# Patient Record
Sex: FEMALE | Race: WHITE | Employment: STUDENT | URBAN - METROPOLITAN AREA
[De-identification: names, ages, dates, MRNs, and addresses within clinical notes are randomized per-mention and may not be internally consistent; named-entity substitution may affect disease eponyms.]

---
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
## 2019-02-28 VITALS
WEIGHT: 160 LBS | SYSTOLIC BLOOD PRESSURE: 110 MMHG | HEART RATE: 105 BPM | OXYGEN SATURATION: 95 % | DIASTOLIC BLOOD PRESSURE: 60 MMHG | HEIGHT: 63 IN | BODY MASS INDEX: 28.35 KG/M2

## 2019-04-03 RX ORDER — DROSPIRENONE AND ETHINYL ESTRADIOL 0.03MG-3MG
KIT ORAL
Qty: 84 TABLET | Refills: 1 | OUTPATIENT
Start: 2019-04-03

## 2019-04-05 RX ORDER — DROSPIRENONE AND ETHINYL ESTRADIOL 0.03MG-3MG
1 KIT ORAL DAILY
Qty: 1 PACKAGE | Refills: 1 | Status: SHIPPED | OUTPATIENT
Start: 2019-04-05 | End: 2019-04-09

## 2019-04-06 RX ORDER — DROSPIRENONE AND ETHINYL ESTRADIOL 0.03MG-3MG
KIT ORAL
Qty: 84 TABLET | Refills: 1 | OUTPATIENT
Start: 2019-04-06

## 2019-04-06 NOTE — TELEPHONE ENCOUNTER
Calling to verify that birth control rx was sent to correct pharmacy.  Pt's chart has been updated [facility] [place]. pls adv

## 2019-04-08 RX ORDER — DROSPIRENONE AND ETHINYL ESTRADIOL 0.03MG-3MG
KIT ORAL
Qty: 84 TABLET | Refills: 1 | OUTPATIENT
Start: 2019-04-08

## 2019-04-08 NOTE — TELEPHONE ENCOUNTER
Pt's last annual was 5/14/18. Pt has annual scheduled on 5/16/19. One pack with one refill was sent to the pharmacy on 4/5/19. Rx denied. Pt already has refills.

## 2019-04-09 RX ORDER — DROSPIRENONE AND ETHINYL ESTRADIOL 0.03MG-3MG
KIT ORAL
Qty: 84 TABLET | Refills: 1 | OUTPATIENT
Start: 2019-04-09

## 2019-04-09 RX ORDER — DROSPIRENONE AND ETHINYL ESTRADIOL 0.03MG-3MG
1 KIT ORAL DAILY
Qty: 1 PACKAGE | Refills: 1 | Status: SHIPPED | OUTPATIENT
Start: 2019-04-09 | End: 2019-05-28

## 2019-05-28 ENCOUNTER — OFFICE VISIT (OUTPATIENT)
Dept: OBGYN CLINIC | Facility: CLINIC | Age: 19
End: 2019-05-28

## 2019-05-28 VITALS
WEIGHT: 156 LBS | HEART RATE: 118 BPM | BODY MASS INDEX: 29 KG/M2 | DIASTOLIC BLOOD PRESSURE: 88 MMHG | SYSTOLIC BLOOD PRESSURE: 132 MMHG

## 2019-05-28 DIAGNOSIS — Z01.419 ENCOUNTER FOR GYNECOLOGICAL EXAMINATION WITHOUT ABNORMAL FINDING: Primary | ICD-10-CM

## 2019-05-28 DIAGNOSIS — Z30.41 ORAL CONTRACEPTIVE PILL SURVEILLANCE: ICD-10-CM

## 2019-05-28 PROCEDURE — 99395 PREV VISIT EST AGE 18-39: CPT | Performed by: OBSTETRICS & GYNECOLOGY

## 2019-05-28 RX ORDER — DROSPIRENONE AND ETHINYL ESTRADIOL 0.03MG-3MG
1 KIT ORAL DAILY
Qty: 3 PACKAGE | Refills: 3 | Status: SHIPPED | OUTPATIENT
Start: 2019-05-28 | End: 2020-07-10

## 2019-05-28 NOTE — PROGRESS NOTES
Miguel Rodriguez is a 20year old female  Patient's last menstrual period was 2019 (approximate). Patient presents with:  Gyn Exam: Annual  Medication Request: ocps  .     OBSTETRICS HISTORY:  OB History    Para Term  AB Livin partner: Not on file        Emotionally abused: Not on file        Physically abused: Not on file        Forced sexual activity: Not on file    Other Topics      Concerns:        Not on file    Social History Narrative      Not on file      FAMILY HISTORY: retraction or skin changes  Abdomen:   soft, nontender, nondistended, no masses  Skin/Hair:  no unusual rashes or bruises  Extremities:  no edema, no cyanosis  Psychiatric:   oriented to time, place, person and situation.  Appropriate mood and affect    Pel

## 2019-07-22 ENCOUNTER — OFFICE VISIT (OUTPATIENT)
Dept: INTERNAL MEDICINE CLINIC | Facility: CLINIC | Age: 19
End: 2019-07-22

## 2019-07-22 VITALS
SYSTOLIC BLOOD PRESSURE: 135 MMHG | WEIGHT: 160 LBS | TEMPERATURE: 98 F | BODY MASS INDEX: 29.44 KG/M2 | HEIGHT: 62 IN | HEART RATE: 111 BPM | DIASTOLIC BLOOD PRESSURE: 83 MMHG

## 2019-07-22 DIAGNOSIS — R47.9 SPEECH DISTURBANCE, UNSPECIFIED TYPE: ICD-10-CM

## 2019-07-22 DIAGNOSIS — R22.42 SKIN LUMP OF LEG, LEFT: Primary | ICD-10-CM

## 2019-07-22 PROCEDURE — 99214 OFFICE O/P EST MOD 30 MIN: CPT | Performed by: INTERNAL MEDICINE

## 2019-07-22 NOTE — PROGRESS NOTES
Tae Pitts is a 20year old female. Patient presents with:  Bump: left leg      HPI:     HPI  Patient is a 50-year-old female here with the complaints of a diffuse lump on the left shin. Initially noticed it 3 years ago.   She thinks it is sligh Location: Right arm, Patient Position: Sitting, Cuff Size: adult)   Pulse 111   Temp 98.2 °F (36.8 °C) (Oral)   Ht 5' 2\" (1.575 m)   Wt 160 lb (72.6 kg)   BMI 29.26 kg/m²   Physical Exam   Constitutional: She is oriented to person, place, and time.  She ap complete thorough neurological exam which was completely normal.  Counseling was given. Advised to avoid anxiety. She may follow-up if the speech issue continues to be worse or worsening anxiety and any other associated symptoms.   She is comfortable with

## 2019-08-27 ENCOUNTER — OFFICE VISIT (OUTPATIENT)
Dept: INTERNAL MEDICINE CLINIC | Facility: CLINIC | Age: 19
End: 2019-08-27

## 2019-08-27 VITALS
BODY MASS INDEX: 29.63 KG/M2 | HEART RATE: 106 BPM | WEIGHT: 161 LBS | HEIGHT: 62 IN | DIASTOLIC BLOOD PRESSURE: 84 MMHG | SYSTOLIC BLOOD PRESSURE: 125 MMHG

## 2019-08-27 DIAGNOSIS — Z00.00 ROUTINE PHYSICAL EXAMINATION: Primary | ICD-10-CM

## 2019-08-27 PROCEDURE — 99385 PREV VISIT NEW AGE 18-39: CPT | Performed by: NURSE PRACTITIONER

## 2019-08-29 ENCOUNTER — LAB ENCOUNTER (OUTPATIENT)
Dept: LAB | Facility: HOSPITAL | Age: 19
End: 2019-08-29
Attending: NURSE PRACTITIONER
Payer: COMMERCIAL

## 2019-08-29 DIAGNOSIS — Z00.00 ROUTINE PHYSICAL EXAMINATION: ICD-10-CM

## 2019-08-29 LAB
ALBUMIN SERPL-MCNC: 4 G/DL (ref 3.4–5)
ALBUMIN/GLOB SERPL: 1 {RATIO} (ref 1–2)
ALP LIVER SERPL-CCNC: 81 U/L (ref 52–144)
ALT SERPL-CCNC: 16 U/L (ref 13–56)
ANION GAP SERPL CALC-SCNC: 10 MMOL/L (ref 0–18)
AST SERPL-CCNC: 10 U/L (ref 15–37)
BASOPHILS # BLD AUTO: 0.04 X10(3) UL (ref 0–0.2)
BASOPHILS NFR BLD AUTO: 0.5 %
BILIRUB SERPL-MCNC: 0.6 MG/DL (ref 0.1–2)
BUN BLD-MCNC: 10 MG/DL (ref 7–18)
BUN/CREAT SERPL: 11.8 (ref 10–20)
CALCIUM BLD-MCNC: 9.6 MG/DL (ref 8.5–10.1)
CHLORIDE SERPL-SCNC: 106 MMOL/L (ref 98–112)
CHOLEST SMN-MCNC: 182 MG/DL (ref ?–200)
CO2 SERPL-SCNC: 24 MMOL/L (ref 21–32)
CREAT BLD-MCNC: 0.85 MG/DL (ref 0.55–1.02)
DEPRECATED RDW RBC AUTO: 39.5 FL (ref 35.1–46.3)
EOSINOPHIL # BLD AUTO: 0.1 X10(3) UL (ref 0–0.7)
EOSINOPHIL NFR BLD AUTO: 1.2 %
ERYTHROCYTE [DISTWIDTH] IN BLOOD BY AUTOMATED COUNT: 12.6 % (ref 11–15)
GLOBULIN PLAS-MCNC: 4.1 G/DL (ref 2.8–4.4)
GLUCOSE BLD-MCNC: 84 MG/DL (ref 70–99)
HCT VFR BLD AUTO: 40.2 % (ref 35–48)
HDLC SERPL-MCNC: 73 MG/DL (ref 40–59)
HGB BLD-MCNC: 13 G/DL (ref 12–16)
IMM GRANULOCYTES # BLD AUTO: 0.02 X10(3) UL (ref 0–1)
IMM GRANULOCYTES NFR BLD: 0.2 %
LDLC SERPL CALC-MCNC: 75 MG/DL (ref ?–100)
LYMPHOCYTES # BLD AUTO: 2.45 X10(3) UL (ref 1–4)
LYMPHOCYTES NFR BLD AUTO: 28.9 %
M PROTEIN MFR SERPL ELPH: 8.1 G/DL (ref 6.4–8.2)
MCH RBC QN AUTO: 28 PG (ref 26–34)
MCHC RBC AUTO-ENTMCNC: 32.3 G/DL (ref 31–37)
MCV RBC AUTO: 86.5 FL (ref 80–100)
MONOCYTES # BLD AUTO: 0.55 X10(3) UL (ref 0.1–1)
MONOCYTES NFR BLD AUTO: 6.5 %
NEUTROPHILS # BLD AUTO: 5.32 X10 (3) UL (ref 1.5–7.7)
NEUTROPHILS # BLD AUTO: 5.32 X10(3) UL (ref 1.5–7.7)
NEUTROPHILS NFR BLD AUTO: 62.7 %
NONHDLC SERPL-MCNC: 109 MG/DL (ref ?–130)
OSMOLALITY SERPL CALC.SUM OF ELEC: 288 MOSM/KG (ref 275–295)
PATIENT FASTING: YES
PATIENT FASTING: YES
PLATELET # BLD AUTO: 343 10(3)UL (ref 150–450)
POTASSIUM SERPL-SCNC: 4 MMOL/L (ref 3.5–5.1)
RBC # BLD AUTO: 4.65 X10(6)UL (ref 3.8–5.3)
RBC #/AREA URNS AUTO: 7 /HPF
SODIUM SERPL-SCNC: 140 MMOL/L (ref 136–145)
TRIGL SERPL-MCNC: 169 MG/DL (ref 30–149)
TSI SER-ACNC: 2.66 MIU/ML (ref 0.36–3.74)
VIT B12 SERPL-MCNC: 399 PG/ML (ref 193–986)
VLDLC SERPL CALC-MCNC: 34 MG/DL (ref 0–30)
WBC # BLD AUTO: 8.5 X10(3) UL (ref 4–11)
WBC #/AREA URNS AUTO: 20 /HPF

## 2019-08-29 PROCEDURE — 80053 COMPREHEN METABOLIC PANEL: CPT

## 2019-08-29 PROCEDURE — 82306 VITAMIN D 25 HYDROXY: CPT

## 2019-08-29 PROCEDURE — 36415 COLL VENOUS BLD VENIPUNCTURE: CPT

## 2019-08-29 PROCEDURE — 84443 ASSAY THYROID STIM HORMONE: CPT

## 2019-08-29 PROCEDURE — 82607 VITAMIN B-12: CPT

## 2019-08-29 PROCEDURE — 80061 LIPID PANEL: CPT

## 2019-08-29 PROCEDURE — 81015 MICROSCOPIC EXAM OF URINE: CPT

## 2019-08-29 PROCEDURE — 85025 COMPLETE CBC W/AUTO DIFF WBC: CPT

## 2019-08-30 LAB — 25(OH)D3 SERPL-MCNC: 30.4 NG/ML (ref 30–100)

## 2019-10-02 ENCOUNTER — OFFICE VISIT (OUTPATIENT)
Dept: INTERNAL MEDICINE CLINIC | Facility: CLINIC | Age: 19
End: 2019-10-02

## 2019-10-02 VITALS
RESPIRATION RATE: 20 BRPM | HEIGHT: 62 IN | HEART RATE: 106 BPM | DIASTOLIC BLOOD PRESSURE: 80 MMHG | OXYGEN SATURATION: 97 % | BODY MASS INDEX: 29.57 KG/M2 | TEMPERATURE: 99 F | WEIGHT: 160.69 LBS | SYSTOLIC BLOOD PRESSURE: 130 MMHG

## 2019-10-02 DIAGNOSIS — H53.9 VISION ABNORMALITIES: Primary | ICD-10-CM

## 2019-10-02 DIAGNOSIS — N92.6 IRREGULAR PERIODS/MENSTRUAL CYCLES: ICD-10-CM

## 2019-10-02 DIAGNOSIS — N60.19 FIBROCYSTIC BREAST CHANGES, UNSPECIFIED LATERALITY: ICD-10-CM

## 2019-10-02 PROCEDURE — 99214 OFFICE O/P EST MOD 30 MIN: CPT | Performed by: INTERNAL MEDICINE

## 2019-10-02 NOTE — PATIENT INSTRUCTIONS
Problem List Items Addressed This Visit        Unprioritized    Fibrocystic breast changes    Irregular periods/menstrual cycles     Cycles are currently regulated on Julia.   Given family history of breast cancer in a maternal aunt diagnosed recently and a landy

## 2019-10-02 NOTE — ASSESSMENT & PLAN NOTE
Cycles are currently regulated on Julia. Given family history of breast cancer in a maternal aunt diagnosed recently and a genetic mutation being present–will consider alternate forms of management for menstrual cycle irregularity.   Will follow-up after lab

## 2019-10-02 NOTE — ASSESSMENT & PLAN NOTE
Recently worsening shadowy visual abnormalities especially with horizontal gaze. No nystagmus noted. She does have a history of migraines though she does not have any symptoms at this time.   She does have a family history of a pituitary adenoma with elev

## 2019-10-02 NOTE — PROGRESS NOTES
HPI:    Patient ID: Sylwia Mora is a 20year old female. New patient to me, [facility].   Has been seen by Dr. Diogo Phillips and then by Ramón Sender for her physical.    Concerns at this time–history of palpitations, EKG look normal excepting for si fatigue. HENT: Negative. Eyes: Positive for visual disturbance. Respiratory: Negative. Cardiovascular: Positive for palpitations. Gastrointestinal: Negative. Negative for nausea and vomiting. Endocrine: Negative. Genitourinary: Negative. Skin: No rash noted. No erythema. Psychiatric: She has a normal mood and affect. Her behavior is normal. Thought content normal.   Nursing note and vitals reviewed.              ASSESSMENT/PLAN:     Problem List Items Addressed This Visit        Kallie Flannery PROLACTIN (Completed)    INSULIN (Completed)    IGF-1 (Completed)    CORTISOL (Completed)    DEHYDROEPIANDROSTERONE (DHEA) (Completed)          Return in about 4 weeks (around 10/30/2019), or if symptoms worsen or fail to improve.     PT UNDERSTANDS AND

## 2019-10-09 ENCOUNTER — APPOINTMENT (OUTPATIENT)
Dept: LAB | Facility: HOSPITAL | Age: 19
End: 2019-10-09
Attending: INTERNAL MEDICINE
Payer: COMMERCIAL

## 2019-10-09 DIAGNOSIS — N92.6 IRREGULAR PERIODS/MENSTRUAL CYCLES: ICD-10-CM

## 2019-10-09 DIAGNOSIS — H53.9 VISION ABNORMALITIES: ICD-10-CM

## 2019-10-09 PROCEDURE — 82533 TOTAL CORTISOL: CPT

## 2019-10-09 PROCEDURE — 82627 DEHYDROEPIANDROSTERONE: CPT

## 2019-10-09 PROCEDURE — 36415 COLL VENOUS BLD VENIPUNCTURE: CPT

## 2019-10-09 PROCEDURE — 83525 ASSAY OF INSULIN: CPT

## 2019-10-09 PROCEDURE — 84146 ASSAY OF PROLACTIN: CPT

## 2019-10-09 PROCEDURE — 84305 ASSAY OF SOMATOMEDIN: CPT

## 2019-10-24 ENCOUNTER — OFFICE VISIT (OUTPATIENT)
Dept: OPHTHALMOLOGY | Facility: CLINIC | Age: 19
End: 2019-10-24

## 2019-10-24 DIAGNOSIS — H52.13 HIGH MYOPIA, BILATERAL: ICD-10-CM

## 2019-10-24 DIAGNOSIS — H52.223 REGULAR ASTIGMATISM OF BOTH EYES: ICD-10-CM

## 2019-10-24 DIAGNOSIS — H43.392 FLOATER, VITREOUS, LEFT: ICD-10-CM

## 2019-10-24 DIAGNOSIS — H53.10 SUBJECTIVE VISUAL DISTURBANCE OF BOTH EYES: Primary | ICD-10-CM

## 2019-10-24 PROCEDURE — 99243 OFF/OP CNSLTJ NEW/EST LOW 30: CPT | Performed by: OPHTHALMOLOGY

## 2019-10-24 NOTE — ASSESSMENT & PLAN NOTE
Reassured patient that eyes look very healthy and normal.   Unclear the cause for visual disturbance. Retinas are healthy in both eyes.

## 2019-10-24 NOTE — PROGRESS NOTES
Nena Bhatia is a 20year old female. HPI:     HPI     Consult      Additional comments: Consult per Dr. Luna Arceo patient here for a complete exam per Dr. Radha Handy.   Patient complains that when she moves her eyes back and Constitutional, Gastrointestinal, Neurological, Skin, Genitourinary, Musculoskeletal, HENT, Endocrine, Cardiovascular, Respiratory, Psychiatric, Allergic/Imm, Heme/Lymph    Last edited by Sheree Cruz OT on 10/24/2019  1:05 PM. (History)          PHYSIC symptoms of retinal detachment/tears explained in detail. Patient instructed to call the office if they experience increase in floaters, increase in flashes of light, loss of vision or curtain or veil effect.        Subjective visual disturbance of both

## 2019-10-24 NOTE — PATIENT INSTRUCTIONS
High myopia, bilateral  Patient goes elsewhere for glasses and contacts. There is no evidence of retinal pathology. Floater, vitreous, left   There is no evidence of retinal pathology.   All signs and symptoms of retinal detachment/tears explained in

## 2020-02-14 ENCOUNTER — OFFICE VISIT (OUTPATIENT)
Dept: INTERNAL MEDICINE CLINIC | Facility: CLINIC | Age: 20
End: 2020-02-14

## 2020-02-14 VITALS
WEIGHT: 162 LBS | SYSTOLIC BLOOD PRESSURE: 137 MMHG | TEMPERATURE: 99 F | HEART RATE: 120 BPM | DIASTOLIC BLOOD PRESSURE: 86 MMHG | HEIGHT: 62 IN | BODY MASS INDEX: 29.81 KG/M2

## 2020-02-14 DIAGNOSIS — R39.9 UTI SYMPTOMS: Primary | ICD-10-CM

## 2020-02-14 LAB
MULTISTIX LOT#: ABNORMAL NUMERIC
PH, URINE: 5 (ref 4.5–8)
PROTEIN (URINE DIPSTICK): 300 MG/DL
SPECIFIC GRAVITY: 1.03 (ref 1–1.03)
URINE-COLOR: YELLOW
UROBILINOGEN,SEMI-QN: 0.2 MG/DL (ref 0–1.9)

## 2020-02-14 PROCEDURE — 99213 OFFICE O/P EST LOW 20 MIN: CPT | Performed by: PHYSICIAN ASSISTANT

## 2020-02-14 PROCEDURE — 81002 URINALYSIS NONAUTO W/O SCOPE: CPT | Performed by: PHYSICIAN ASSISTANT

## 2020-02-14 RX ORDER — NITROFURANTOIN 25; 75 MG/1; MG/1
100 CAPSULE ORAL 2 TIMES DAILY
Qty: 14 CAPSULE | Refills: 0 | Status: SHIPPED | OUTPATIENT
Start: 2020-02-14 | End: 2020-07-10

## 2020-02-14 NOTE — PROGRESS NOTES
HPI:    Patient ID: Rosmery Lr is a 20year old female. HPI   Patient presents with UTI sx for the past two days notes urgency to urinate with some light spotting in her urine. Denies fever, chills, back pain, abdominal pain.  Currently sexuall (Oral)   Ht 5' 2\" (1.575 m)   Wt 162 lb (73.5 kg)   LMP 01/30/2020 (Exact Date)   BMI 29.63 kg/m²   Body mass index is 29.63 kg/m². Physical Exam    Constitutional: She is oriented to person, place, and time and thin.  She appears well-developed and well-

## 2020-04-08 ENCOUNTER — TELEPHONE (OUTPATIENT)
Dept: INTERNAL MEDICINE CLINIC | Facility: CLINIC | Age: 20
End: 2020-04-08

## 2020-04-08 ENCOUNTER — NURSE TRIAGE (OUTPATIENT)
Dept: INTERNAL MEDICINE CLINIC | Facility: CLINIC | Age: 20
End: 2020-04-08

## 2020-04-08 RX ORDER — SULFAMETHOXAZOLE AND TRIMETHOPRIM 800; 160 MG/1; MG/1
1 TABLET ORAL 2 TIMES DAILY
Qty: 6 TABLET | Refills: 0 | Status: SHIPPED | OUTPATIENT
Start: 2020-04-08 | End: 2020-04-11

## 2020-04-08 NOTE — TELEPHONE ENCOUNTER
Action Requested: Summary for Provider     []  Critical Lab, Recommendations Needed  [x] Need Additional Advice  []   FYI    []   Need Orders  [x] Need Medications Sent to Pharmacy  []  Other     SUMMARY: Patient requesting Rx to pharmacy for recurrent UTI

## 2020-04-08 NOTE — TELEPHONE ENCOUNTER
----- Message from Jessie 45 sent at 4/8/2020  8:53 AM CDT -----  Regarding: Other  Contact: [phone]  I am having symptoms of a UTI, is there any way I can get prescribed medication over the counter without going in?

## 2020-04-20 RX ORDER — DROSPIRENONE AND ETHINYL ESTRADIOL 0.03MG-3MG
KIT ORAL
Qty: 84 TABLET | Refills: 3 | OUTPATIENT
Start: 2020-04-20

## 2020-04-20 RX ORDER — DROSPIRENONE AND ETHINYL ESTRADIOL 0.03MG-3MG
1 KIT ORAL DAILY
Qty: 3 PACKAGE | Refills: 0 | Status: SHIPPED | OUTPATIENT
Start: 2020-04-20 | End: 2020-07-07

## 2020-07-07 RX ORDER — DROSPIRENONE AND ETHINYL ESTRADIOL 0.03MG-3MG
KIT ORAL
Qty: 1 PACKAGE | Refills: 0 | Status: SHIPPED | OUTPATIENT
Start: 2020-07-07 | End: 2020-07-10

## 2020-07-10 ENCOUNTER — OFFICE VISIT (OUTPATIENT)
Dept: OBGYN CLINIC | Facility: CLINIC | Age: 20
End: 2020-07-10

## 2020-07-10 ENCOUNTER — LAB ENCOUNTER (OUTPATIENT)
Dept: LAB | Facility: HOSPITAL | Age: 20
End: 2020-07-10
Attending: OBSTETRICS & GYNECOLOGY
Payer: COMMERCIAL

## 2020-07-10 VITALS
BODY MASS INDEX: 30.14 KG/M2 | DIASTOLIC BLOOD PRESSURE: 86 MMHG | SYSTOLIC BLOOD PRESSURE: 139 MMHG | WEIGHT: 168 LBS | HEIGHT: 62.5 IN | HEART RATE: 111 BPM

## 2020-07-10 DIAGNOSIS — Z12.4 SCREENING FOR MALIGNANT NEOPLASM OF CERVIX: ICD-10-CM

## 2020-07-10 DIAGNOSIS — D24.1 FIBROADENOMA OF RIGHT BREAST: ICD-10-CM

## 2020-07-10 DIAGNOSIS — Z30.41 ORAL CONTRACEPTIVE PILL SURVEILLANCE: ICD-10-CM

## 2020-07-10 DIAGNOSIS — Z01.419 ENCOUNTER FOR GYNECOLOGICAL EXAMINATION WITHOUT ABNORMAL FINDING: Primary | ICD-10-CM

## 2020-07-10 DIAGNOSIS — Z11.3 SCREEN FOR STD (SEXUALLY TRANSMITTED DISEASE): ICD-10-CM

## 2020-07-10 LAB
HBV SURFACE AG SER-ACNC: <0.1 [IU]/L
HBV SURFACE AG SERPL QL IA: NONREACTIVE
HCV AB SERPL QL IA: NONREACTIVE
T PALLIDUM AB SER QL: NEGATIVE

## 2020-07-10 PROCEDURE — 3008F BODY MASS INDEX DOCD: CPT | Performed by: OBSTETRICS & GYNECOLOGY

## 2020-07-10 PROCEDURE — 87340 HEPATITIS B SURFACE AG IA: CPT

## 2020-07-10 PROCEDURE — 36415 COLL VENOUS BLD VENIPUNCTURE: CPT

## 2020-07-10 PROCEDURE — 87389 HIV-1 AG W/HIV-1&-2 AB AG IA: CPT

## 2020-07-10 PROCEDURE — 86803 HEPATITIS C AB TEST: CPT

## 2020-07-10 PROCEDURE — 86780 TREPONEMA PALLIDUM: CPT

## 2020-07-10 PROCEDURE — 3079F DIAST BP 80-89 MM HG: CPT | Performed by: OBSTETRICS & GYNECOLOGY

## 2020-07-10 PROCEDURE — 99395 PREV VISIT EST AGE 18-39: CPT | Performed by: OBSTETRICS & GYNECOLOGY

## 2020-07-10 PROCEDURE — 3075F SYST BP GE 130 - 139MM HG: CPT | Performed by: OBSTETRICS & GYNECOLOGY

## 2020-07-10 RX ORDER — DROSPIRENONE AND ETHINYL ESTRADIOL 0.03MG-3MG
1 KIT ORAL DAILY
Qty: 3 PACKAGE | Refills: 3 | Status: SHIPPED | OUTPATIENT
Start: 2020-07-10 | End: 2021-09-07

## 2020-07-12 LAB
C TRACH DNA SPEC QL NAA+PROBE: NEGATIVE
N GONORRHOEA DNA SPEC QL NAA+PROBE: NEGATIVE
T VAGINALIS RRNA SPEC QL NAA+PROBE: NEGATIVE

## 2020-07-12 NOTE — PROGRESS NOTES
Alisha Garcia is a 23year old female  Patient's last menstrual period was 2020. Patient presents with:  Gyn Exam: Annual  Medication Request: ocp refill  Std Screen: wishes for full screen  .     OBSTETRICS HISTORY:  OB History   Gravid Active member of club or organization: Not on file        Attends meetings of clubs or organizations: Not on file        Relationship status: Not on file      Intimate partner violence:        Fear of current or ex partner: Not on file        Emotion Neck/Thyroid: thyroid symmetric, no thyromegaly, no nodules, no adenopathy  Lymphatic: no abnormal supraclavicular or axillary adenopathy is noted  Breast:   normal without palpable masses, tenderness, asymmetry, nipple discharge, nipple retraction or skin Pap w/ GC/Chl/Trich done. Condoms encouraged. ASCCP guidelines reviewed. Encourged annual exams. Meds refilled. HIV, Hep B, Hep C, Trep, GC/Chl/Trich screening ordered. Condoms encouraged.

## 2020-07-17 ENCOUNTER — NURSE ONLY (OUTPATIENT)
Dept: INTERNAL MEDICINE CLINIC | Facility: CLINIC | Age: 20
End: 2020-07-17

## 2020-07-17 PROCEDURE — 90471 IMMUNIZATION ADMIN: CPT | Performed by: INTERNAL MEDICINE

## 2020-07-17 PROCEDURE — 90715 TDAP VACCINE 7 YRS/> IM: CPT | Performed by: INTERNAL MEDICINE

## 2020-07-17 NOTE — PROGRESS NOTES
Patient came in for nurse visit. Verified orders with pcp, name/. Pt was administered Tdap on left deltoid. Pt tolerated injection with no reactions.

## 2021-03-14 ENCOUNTER — PATIENT MESSAGE (OUTPATIENT)
Dept: INTERNAL MEDICINE CLINIC | Facility: CLINIC | Age: 21
End: 2021-03-14

## 2021-03-15 ENCOUNTER — NURSE TRIAGE (OUTPATIENT)
Dept: INTERNAL MEDICINE CLINIC | Facility: CLINIC | Age: 21
End: 2021-03-15

## 2021-03-15 DIAGNOSIS — H53.8 BLURRY VISION: Primary | ICD-10-CM

## 2021-03-15 NOTE — TELEPHONE ENCOUNTER
Routed to the triage pool for RN follow-up.           ----- Message from Milagrosmyrnamarvin Corky sent at 3/14/2021  9:02 PM CDT -----  Regarding: Non-Urgent Medical Question  Contact: [phone]  I am experiencing eye issues (cloudiness in my eyes).  I would

## 2021-03-15 NOTE — TELEPHONE ENCOUNTER
From: Chula eLvine  To: Vanesa Shea MD  Sent: 3/14/2021 9:02 PM CDT  Subject: Non-Urgent Medical Question    I am experiencing eye issues (cloudiness in my eyes).  I would like to request a referral to see an ophthalmologist.

## 2021-03-16 NOTE — TELEPHONE ENCOUNTER
Action Requested: Summary for Provider     []  Critical Lab, Recommendations Needed  [] Need Additional Advice  []   FYI    [x]   Need Orders  [] Need Medications Sent to Pharmacy  []  Other     SUMMARY: Patient states when she moves her eyes back and fort

## 2021-03-17 ENCOUNTER — TELEPHONE (OUTPATIENT)
Dept: OPHTHALMOLOGY | Facility: CLINIC | Age: 21
End: 2021-03-17

## 2021-03-17 NOTE — TELEPHONE ENCOUNTER
Spoke to pt and asked what issues she was having and pt stated she was having more \"vision changes\" similar to what she came for in 2019 but is more prominent now. Pt wears glasses and contacts and denies any other issues. Scheduled pt on 4/6/21 @ 1:45.

## 2021-04-06 ENCOUNTER — OFFICE VISIT (OUTPATIENT)
Dept: OPHTHALMOLOGY | Facility: CLINIC | Age: 21
End: 2021-04-06

## 2021-04-06 DIAGNOSIS — H52.13 HIGH MYOPIA, BILATERAL: ICD-10-CM

## 2021-04-06 DIAGNOSIS — H52.223 REGULAR ASTIGMATISM OF BOTH EYES: ICD-10-CM

## 2021-04-06 DIAGNOSIS — H50.52 EXOPHORIA OF BOTH EYES: ICD-10-CM

## 2021-04-06 DIAGNOSIS — H53.022 REFRACTIVE AMBLYOPIA, LEFT: ICD-10-CM

## 2021-04-06 DIAGNOSIS — H53.10 SUBJECTIVE VISUAL DISTURBANCE OF BOTH EYES: Primary | ICD-10-CM

## 2021-04-06 DIAGNOSIS — H43.392 FLOATER, VITREOUS, LEFT: ICD-10-CM

## 2021-04-06 PROCEDURE — 92014 COMPRE OPH EXAM EST PT 1/>: CPT | Performed by: OPHTHALMOLOGY

## 2021-04-06 NOTE — PATIENT INSTRUCTIONS
Refractive amblyopia, left  Longstanding; per patient her left eye has always been weaker. High myopia, bilateral  Continue with same glasses and contacts. Regular astigmatism of both eyes  Patient goes elsewhere for glasses and contacts.      Subj

## 2021-04-06 NOTE — ASSESSMENT & PLAN NOTE
Reassured patient that eyes look very healthy and normal.   Unclear the cause for visual disturbance. Retinas are healthy in both eyes. Recommend neuro-ophthalmology.    Patient should check with Dr. Giles French and her insurance to see what neuro-ophthalmol

## 2021-04-06 NOTE — PROGRESS NOTES
Meka Edmonds is a 23year old female.     HPI:     HPI     Consult      Additional comments: Per Dr. Maynor Cordova              Comments     Pt in today for a complete eye exam. Pt's last eye exam was about 7 months ago at [facility] and got an updated glass for: Constitutional, Gastrointestinal, Neurological, Skin, Genitourinary, Musculoskeletal, HENT, Endocrine, Cardiovascular, Respiratory, Psychiatric, Allergic/Imm, Heme/Lymph    Last edited by Siria Barragan OT on 4/6/2021  1:45 PM. (History)          P of both eyes  Patient goes elsewhere for glasses and contacts. Subjective visual disturbance of both eyes  Reassured patient that eyes look very healthy and normal.   Unclear the cause for visual disturbance. Retinas are healthy in both eyes.    Recom

## 2021-04-07 ENCOUNTER — TELEPHONE (OUTPATIENT)
Dept: INTERNAL MEDICINE CLINIC | Facility: CLINIC | Age: 21
End: 2021-04-07

## 2021-04-07 DIAGNOSIS — H53.022 REFRACTIVE AMBLYOPIA, LEFT EYE: Primary | ICD-10-CM

## 2021-04-07 NOTE — TELEPHONE ENCOUNTER
Patient's mom called. Patient verbalized it was okay to talk with mom. Mom provided the opthamologist name that is in the patient's health insurance network. Referral pended for review.

## 2021-04-07 NOTE — TELEPHONE ENCOUNTER
Spoke with patient's mom who was aware of the phone call that was made in regards to neuro-ophthalmology.    Advised mom that we currently do not have neuro-ophthalmology in the group, advised mom to contact [facility] if prefers or dr. Ranjith Ruth (progressive

## 2021-04-07 NOTE — TELEPHONE ENCOUNTER
Per patient she saw Dr. Preston Viera yesterday and was referred to see a neuro-ophthalmologist. She is preferring to go to [facility] if possible as she has a history with that clinic. If she can't go there her other preference will be [facility].       I hav

## 2021-04-14 NOTE — TELEPHONE ENCOUNTER
Patient/ mother  calling reports Dr. Vincenzo Menard is NOT taking any new patients    Need to see Neuro/ Opth  Her insurance told mom they can go out of network due to unavailability    Insurance has recommended the [place] clinic :   Shelley Velasquez

## 2021-04-15 NOTE — TELEPHONE ENCOUNTER
PER Dr. Zeina Mueller. ALL 3 Neurologist are great and to schedule whoever can get patient in first available. Pts mom will call [facility] and see who pt can get into see for first available. Then she will have Dr. Plata Cophenry write the referral for that appointment.

## 2021-04-15 NOTE — TELEPHONE ENCOUNTER
Please send this back to ophthalmology. They were the ones that made the referral and hence they should be able to take the correct physician to go to as I do not know this speciality.

## 2021-04-22 ENCOUNTER — TELEPHONE (OUTPATIENT)
Dept: INTERNAL MEDICINE CLINIC | Facility: CLINIC | Age: 21
End: 2021-04-22

## 2021-04-22 DIAGNOSIS — H50.52 EXOPHORIA OF BOTH EYES: ICD-10-CM

## 2021-04-22 DIAGNOSIS — H43.392 FLOATER, VITREOUS, LEFT: ICD-10-CM

## 2021-04-22 DIAGNOSIS — H53.9 VISION ABNORMALITIES: Primary | ICD-10-CM

## 2021-04-22 NOTE — TELEPHONE ENCOUNTER
Pt calling to f/u on status of [facility]. Explained it was placed and waiting on insurance approval. Advised to call Managed Care for a f/u on status, phone number provided.

## 2021-04-23 NOTE — TELEPHONE ENCOUNTER
Please note that [facility] is not within patient's network. Please re-direct patient to an in network provider. Thank you, Rajat Neal Specialist    Managed Care.

## 2021-04-27 NOTE — TELEPHONE ENCOUNTER
We have gone back and forth on this for quite a while. I did place a referral as I thought fit. If this is not covered, I request that [facility] provide me names of physicians that I can use.

## 2021-04-28 NOTE — TELEPHONE ENCOUNTER
Dr. Saleem Meng is Neuro-Opth [phone] within [address] [facility]. If patient cannot be seen by this provider she will need to be seen at tertiary facility [facility], Franco Chou or DUONG HENAO).     Thank you, Denisa Hwang Specialist    Managed

## 2021-04-29 NOTE — TELEPHONE ENCOUNTER
Spoke to tu in managed care, tu will call P and see which neuro/opth, pt is able to see. Advised Dr. Bety Santos.

## 2021-04-29 NOTE — TELEPHONE ENCOUNTER
Patient can be seen at [facility] department. Patient can also be seen at [facility] or [address] [place] [address]. For additional providers patient can contact [facility] directly at [phone]. Below are a listing of providers at [facility].

## 2021-04-29 NOTE — TELEPHONE ENCOUNTER
please see earlier communications with regards to this on April 7, 2021.       Note     Patient/ mother  calling reports Dr. Brigido Albarado is NOT taking any new patients     Need to see Neuro/ Opth  Her insurance told mom they can go out of network due to unav

## 2021-04-30 NOTE — TELEPHONE ENCOUNTER
Mom calling to follow-up on the status of the referral. States she was given info from [facility] that [facility] would be considered in network since Dr Carlos Trujillo is not accepting new pt's.  I spoke with Saturnino Rivera in [facility] and she states this is not true

## 2021-05-03 ENCOUNTER — TELEPHONE (OUTPATIENT)
Dept: INTERNAL MEDICINE CLINIC | Facility: CLINIC | Age: 21
End: 2021-05-03

## 2021-05-03 ENCOUNTER — TELEPHONE (OUTPATIENT)
Dept: CASE MANAGEMENT | Age: 21
End: 2021-05-03

## 2021-05-03 DIAGNOSIS — H50.52 EXOPHORIA OF BOTH EYES: Primary | ICD-10-CM

## 2021-05-03 DIAGNOSIS — H53.022 REFRACTIVE AMBLYOPIA OF LEFT EYE: Primary | ICD-10-CM

## 2021-05-03 DIAGNOSIS — H50.52 EXOPHORIA OF BOTH EYES: ICD-10-CM

## 2021-05-03 NOTE — TELEPHONE ENCOUNTER
Dr Maribeth Seay, please see new pending referral to [facility] neuro ophthalmology Dr Pedro Easley  .   Please reply to pool: EM TRIAGE SUPPORT

## 2021-05-03 NOTE — TELEPHONE ENCOUNTER
documentation that they are not accepting new patients to include with the referral to [facility], office closed until 0800 5/4/21    Patient's mother Kwan Wright, calling with an update.  P contacted her today and gave her the names of 4 neuro ophthamologists at [facility]

## 2021-05-04 NOTE — TELEPHONE ENCOUNTER
Contacted  at Dr Stephen Almendarez office, although they did open for new patients again yesterday, they are booking appointments in August. They will send fax to [facility] documenting this Fax# [phone]

## 2021-05-11 NOTE — TELEPHONE ENCOUNTER
Referral has been sent to [facility] for review. Thank you, Sandra Gibbons Specialist    Managed Care.

## 2021-05-20 NOTE — TELEPHONE ENCOUNTER
Please see message below from patient's health plan below. PCP can have a peer to peer with Medical Director if she likes. Please advise. Thank you, Saqib Lujan Specialist    Managed Care.

## 2021-05-22 NOTE — TELEPHONE ENCOUNTER
Managed care-  As primary care physician-I can only request managed care or available doctors to provide care for the patient. I cannot force managed care to authorize or the physicians available to provide care for the above-mentioned patient.   I have do

## 2021-05-24 NOTE — TELEPHONE ENCOUNTER
See other Telephone encounter 5/3/21. Patient's mother calling to reports that they were able to get an appointment with Dr. Tonette Lefort - Neuro-opthamalogist in [place], June 21, 2021.

## 2021-05-24 NOTE — TELEPHONE ENCOUNTER
[facility] care - [facility]      Patient's mother calling to reports that they were able to get an appointment with Dr. Alyssa Petit - Neuro-opthamalogist in [place], June 21, 2021.

## 2021-07-08 ENCOUNTER — TELEPHONE (OUTPATIENT)
Dept: INTERNAL MEDICINE CLINIC | Facility: CLINIC | Age: 21
End: 2021-07-08

## 2021-07-08 NOTE — TELEPHONE ENCOUNTER
Left message for patient to call back (no RHEA found designating can discuss healtcare with father).

## 2021-07-08 NOTE — TELEPHONE ENCOUNTER
Patient has questions about taking COVID vaccine while she is taking medications and how it will affect her. Please call [phone].

## 2021-07-08 NOTE — TELEPHONE ENCOUNTER
Spoke with dad--asking if Dr. 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

## 2021-07-08 NOTE — TELEPHONE ENCOUNTER
She needs to take the vaccine,moderna /pfizer preferably. She should also request a change in the bcp-take one without drosopirenone.   Have gyne correct that

## 2021-07-21 ENCOUNTER — HOSPITAL ENCOUNTER (OUTPATIENT)
Dept: MRI IMAGING | Facility: HOSPITAL | Age: 21
Discharge: HOME | End: 2021-07-21
Attending: OPHTHALMOLOGY
Payer: COMMERCIAL

## 2021-07-21 DIAGNOSIS — H54.7 VISION LOSS: ICD-10-CM

## 2021-07-21 DIAGNOSIS — E07.9 THYROID DISEASE: ICD-10-CM

## 2021-07-21 PROCEDURE — A9575 INJ GADOTERATE MEGLUMI 0.1ML: HCPCS | Performed by: OPHTHALMOLOGY

## 2021-07-21 PROCEDURE — 70543 MRI ORBT/FAC/NCK W/O &W/DYE: CPT | Performed by: OPHTHALMOLOGY

## 2021-07-21 PROCEDURE — 70553 MRI BRAIN STEM W/O & W/DYE: CPT | Performed by: OPHTHALMOLOGY

## 2021-09-07 DIAGNOSIS — Z78.9 USES BIRTH CONTROL: Primary | ICD-10-CM

## 2021-09-07 RX ORDER — DROSPIRENONE AND ETHINYL ESTRADIOL 0.03MG-3MG
1 KIT ORAL DAILY
Qty: 84 TABLET | Refills: 0 | Status: SHIPPED | OUTPATIENT
Start: 2021-09-07 | End: 2021-11-24

## 2021-09-07 RX ORDER — DROSPIRENONE AND ETHINYL ESTRADIOL 0.03MG-3MG
KIT ORAL
Qty: 84 TABLET | Refills: 3 | OUTPATIENT
Start: 2021-09-07

## 2021-09-07 NOTE — TELEPHONE ENCOUNTER
Patient calling needing a refill on BC. Has scheduled appointment for November 18th with Dr. Kayode Morrow.      Please refill up until then

## 2021-09-07 NOTE — TELEPHONE ENCOUNTER
Pts last annual 7/10/2020: PAP/HPV negative  Next annual scheduled 11/18/2021 with LAURA. OCP refill sent to cover pt until annual per protocol.  Left message on private VM that refill request has been sent to [facility] on-file to cover her until annual, pt instruc

## 2021-09-10 ENCOUNTER — MED REC SCAN ONLY (OUTPATIENT)
Dept: INTERNAL MEDICINE CLINIC | Facility: CLINIC | Age: 21
End: 2021-09-10

## 2021-11-24 DIAGNOSIS — Z78.9 USES BIRTH CONTROL: ICD-10-CM

## 2021-11-24 RX ORDER — DROSPIRENONE AND ETHINYL ESTRADIOL 0.03MG-3MG
KIT ORAL
Qty: 84 TABLET | Refills: 0 | Status: SHIPPED | OUTPATIENT
Start: 2021-11-24

## 2021-12-21 ENCOUNTER — LAB ENCOUNTER (OUTPATIENT)
Dept: LAB | Facility: HOSPITAL | Age: 21
End: 2021-12-21
Attending: INTERNAL MEDICINE
Payer: COMMERCIAL

## 2021-12-21 ENCOUNTER — TELEPHONE (OUTPATIENT)
Dept: INTERNAL MEDICINE CLINIC | Facility: CLINIC | Age: 21
End: 2021-12-21

## 2021-12-21 DIAGNOSIS — Z11.52 ENCOUNTER FOR SCREENING FOR COVID-19: Primary | ICD-10-CM

## 2021-12-21 DIAGNOSIS — Z11.52 ENCOUNTER FOR SCREENING FOR COVID-19: ICD-10-CM

## 2021-12-28 ENCOUNTER — TELEPHONE (OUTPATIENT)
Dept: INTERNAL MEDICINE CLINIC | Facility: CLINIC | Age: 21
End: 2021-12-28

## 2021-12-28 NOTE — TELEPHONE ENCOUNTER
Spoke with pt's mom, JACKELYN verified, she stated pt tested positive for covid on 21. She  just want to report pt has stuffy nose, just recently loss of taste and smell but she has more energy, her sx got improved. Pt is not vaccinated yet.     Pt sti

## 2021-12-31 NOTE — TELEPHONE ENCOUNTER
Patient is still having nasal congestion, using nasal spray and flonase. Patient requesting note for school. States she needs a negative test to go back to school. Patient scheduled for video visit.   Patient advised to complete the e-check in in [employer]

## 2022-01-05 ENCOUNTER — TELEPHONE (OUTPATIENT)
Dept: INTERNAL MEDICINE CLINIC | Facility: CLINIC | Age: 22
End: 2022-01-05

## 2022-01-05 NOTE — TELEPHONE ENCOUNTER
Patient is a student at [employer] and tested + for [place] on 12/21/21 and her school is requiring a negative Covid 19 test prior to returning to class on January 30.  Advised of CDC recommendations however, her college will not accept a test from any

## 2022-01-07 ENCOUNTER — VIRTUAL PHONE E/M (OUTPATIENT)
Dept: INTERNAL MEDICINE CLINIC | Facility: CLINIC | Age: 22
End: 2022-01-07

## 2022-01-07 DIAGNOSIS — U07.1 COVID-19 VIRUS INFECTION: Primary | ICD-10-CM

## 2022-01-07 PROCEDURE — 99213 OFFICE O/P EST LOW 20 MIN: CPT | Performed by: INTERNAL MEDICINE

## 2022-01-07 NOTE — PROGRESS NOTES
Virtual Telephone Check-In    Isidro Robles verbally consents to a Virtual/Telephone Check-In visit on 01/07/22. Patient has been referred to the [facility] website at [email or web address] to review the yearly Consent to Treat document.     Patient un blocled tear duct   • UTI   09/06/01     Past Surgical History:   Procedure Laterality Date   • OTHER SURGICAL HISTORY Right 2018    right breast biopsy  - benign      Social History:  Social History    Tobacco Use      Smoking status: Never Smoker      Sm

## 2022-01-24 ENCOUNTER — NURSE ONLY (OUTPATIENT)
Dept: LAB | Age: 22
End: 2022-01-24
Attending: INTERNAL MEDICINE
Payer: COMMERCIAL

## 2022-01-24 DIAGNOSIS — U07.1 COVID-19 VIRUS INFECTION: ICD-10-CM

## 2022-01-25 LAB — SARS-COV-2 RNA RESP QL NAA+PROBE: NOT DETECTED

## 2022-02-10 RX ORDER — DROSPIRENONE AND ETHINYL ESTRADIOL 0.03MG-3MG
KIT ORAL
Qty: 84 TABLET | Refills: 0 | OUTPATIENT
Start: 2022-02-10

## 2022-04-02 ENCOUNTER — NURSE TRIAGE (OUTPATIENT)
Dept: INTERNAL MEDICINE CLINIC | Facility: CLINIC | Age: 22
End: 2022-04-02

## 2022-04-04 ENCOUNTER — OFFICE VISIT (OUTPATIENT)
Dept: INTERNAL MEDICINE CLINIC | Facility: CLINIC | Age: 22
End: 2022-04-04
Payer: COMMERCIAL

## 2022-04-04 VITALS
SYSTOLIC BLOOD PRESSURE: 139 MMHG | HEIGHT: 62.5 IN | DIASTOLIC BLOOD PRESSURE: 89 MMHG | HEART RATE: 105 BPM | WEIGHT: 181 LBS | BODY MASS INDEX: 32.47 KG/M2

## 2022-04-04 DIAGNOSIS — F41.9 ANXIETY: ICD-10-CM

## 2022-04-04 DIAGNOSIS — Z00.00 ANNUAL PHYSICAL EXAM: Primary | ICD-10-CM

## 2022-04-04 DIAGNOSIS — E55.9 VITAMIN D DEFICIENCY: ICD-10-CM

## 2022-04-04 PROCEDURE — 3079F DIAST BP 80-89 MM HG: CPT | Performed by: NURSE PRACTITIONER

## 2022-04-04 PROCEDURE — 3008F BODY MASS INDEX DOCD: CPT | Performed by: NURSE PRACTITIONER

## 2022-04-04 PROCEDURE — 99213 OFFICE O/P EST LOW 20 MIN: CPT | Performed by: NURSE PRACTITIONER

## 2022-04-04 PROCEDURE — 3075F SYST BP GE 130 - 139MM HG: CPT | Performed by: NURSE PRACTITIONER

## 2022-04-04 RX ORDER — ALPRAZOLAM 0.25 MG/1
0.25 TABLET ORAL NIGHTLY PRN
Qty: 30 TABLET | Refills: 0 | Status: SHIPPED | OUTPATIENT
Start: 2022-04-04

## 2022-04-05 NOTE — ASSESSMENT & PLAN NOTE
Patient with increased anxiety especially the last week. The increased anxiety started one year ago. Patient recently stopped birth control. Plan  Deep breathing exercises reviewed  [facility] [employer] navigator  [employer] Referral  Xanax 0.25mg po taken daily PRN x 1. Will use only when absolutely necessary.

## 2022-04-11 ENCOUNTER — TELEPHONE (OUTPATIENT)
Dept: INTERNAL MEDICINE CLINIC | Facility: CLINIC | Age: 22
End: 2022-04-11

## 2022-04-11 NOTE — TELEPHONE ENCOUNTER
Left message for patient to call back. I dont see a referral for rehab ? Is this for physical therapy ?

## 2022-04-11 NOTE — TELEPHONE ENCOUNTER
Per patient she was referred to [facility] and would like to know if she can be referred to a place nearer to her place.

## 2022-04-12 NOTE — TELEPHONE ENCOUNTER
Spoke with patient ( verified) and relayed LOUIE Andersen's  message below--patient verbalizes understanding and agreement. No further questions/concerns at this time.

## 2022-04-12 NOTE — TELEPHONE ENCOUNTER
Instruct patient that she can do virtual visits with Sharon Tsang.     WINSOME Lopez  Working with Margoth Ying

## 2022-05-23 RX ORDER — MONTELUKAST SODIUM 10 MG/1
TABLET ORAL
Qty: 30 TABLET | Refills: 0 | OUTPATIENT
Start: 2022-05-23

## 2022-08-28 DIAGNOSIS — E34.9 HORMONE DISORDER: Primary | ICD-10-CM

## 2022-09-01 ENCOUNTER — TELEPHONE (OUTPATIENT)
Dept: INTERNAL MEDICINE CLINIC | Facility: CLINIC | Age: 22
End: 2022-09-01

## 2022-09-01 DIAGNOSIS — H50.52 EXOPHORIA OF BOTH EYES: Primary | ICD-10-CM

## 2022-09-01 DIAGNOSIS — H53.022 REFRACTIVE AMBLYOPIA OF LEFT EYE: ICD-10-CM

## 2022-09-01 NOTE — TELEPHONE ENCOUNTER
Patient is requesting referral.     Name of specialist and specialty department : Opthamology  Reason for visit with the specialist: Yearly check up and contacts, cloudiness in eyes   Address of the specialist office:n/a  Appointment date: n/a         CSS informed patient the turnaround time for referral is 5-7 business days. Patient was informed to check their [facility] account for referral status.

## 2022-09-05 NOTE — TELEPHONE ENCOUNTER
Patient sent [employer] message following up on referral request:      To: MARGARITA RN TRIAGE      From: Karlos Sutton      Created: 9/5/2022 8:07 AM          Hi. I called last week to get a referral to see an  ophthalmologist for a yearly exam and a contact perception. I was following up to see if the referral was made and who I would be going to so I can schedule my appointment.

## 2022-09-07 NOTE — TELEPHONE ENCOUNTER
Good Afternoon Mikhail Salvador,    Please see message below and advise what Optha you are referring patient to.     Once referral is pended with name of specialist [facility] can work referral.    Thank you for your help    [facility]

## 2022-09-09 NOTE — TELEPHONE ENCOUNTER
Pt/mother calling asking for Opthamology referral in the Network, stated Pt need contact lens but need appt first , asking for Dr in [employer] instead of external as last year   See below message , Dx added as last exam

## 2022-09-11 NOTE — TELEPHONE ENCOUNTER
Patient has HMO and referrals require a specific doctor.  The referral placed is a blank referral     Please enter a new referral

## 2022-09-12 DIAGNOSIS — H53.9 VISION ABNORMALITIES: Primary | ICD-10-CM

## 2022-09-13 ENCOUNTER — TELEPHONE (OUTPATIENT)
Dept: INTERNAL MEDICINE CLINIC | Facility: CLINIC | Age: 22
End: 2022-09-13

## 2022-09-13 NOTE — TELEPHONE ENCOUNTER
Patient called and she was not home. Her mother asked if she can relay the message. She needs to find a eye specialist for her condition. She was following with [facility] eye clinic and they are no longer in her plan. Her mother stated that she contacted our eye clinic and they were not taking any new patients for her condition and contacts?  I instructed her to call the number on the back of her card to find out who is in network and then to call me and I will generate a referral.

## 2022-09-13 NOTE — TELEPHONE ENCOUNTER
Patient calling to discuss her referral for an eye doctor. She called our department, but no one there does contacts. She would like to be advised on where else she can go.

## 2022-09-14 ENCOUNTER — PATIENT MESSAGE (OUTPATIENT)
Dept: INTERNAL MEDICINE CLINIC | Facility: CLINIC | Age: 22
End: 2022-09-14

## 2022-09-14 DIAGNOSIS — H53.9 VISION ABNORMALITIES: Primary | ICD-10-CM

## 2022-09-14 NOTE — TELEPHONE ENCOUNTER
From: Va Allan  To: CITLALI Moody  Sent: 9/14/2022 10:07 AM CDT  Subject: Referral request     I found an ophthalmologist that is taking new patients and can give me a contact prescription. I will need a new referral so I can schedule and appointment. Her name is Dr Jonna Jones. Thank you.

## 2022-09-16 ENCOUNTER — TELEPHONE (OUTPATIENT)
Dept: CASE MANAGEMENT | Age: 22
End: 2022-09-16

## 2022-09-16 DIAGNOSIS — H53.9 VISION ABNORMALITIES: Primary | ICD-10-CM

## 2022-09-19 NOTE — TELEPHONE ENCOUNTER
Pt mother calling regards to referral. Advised pt mother pt chart referral in entered. Pt mother requests referral mailed to pt also and sent via my chart. Advised that referrals are accessible through my chart, but pt mother requests it is sent as a message to pt. Please mail pt copy of referral.    My chart message sent to pt as requested with referral information.

## 2022-11-08 ENCOUNTER — OFFICE VISIT (OUTPATIENT)
Dept: ENDOCRINOLOGY CLINIC | Facility: CLINIC | Age: 22
End: 2022-11-08
Payer: COMMERCIAL

## 2022-11-08 VITALS
WEIGHT: 201.63 LBS | SYSTOLIC BLOOD PRESSURE: 125 MMHG | BODY MASS INDEX: 36 KG/M2 | DIASTOLIC BLOOD PRESSURE: 85 MMHG | HEART RATE: 106 BPM

## 2022-11-08 DIAGNOSIS — E78.5 DYSLIPIDEMIA: Primary | ICD-10-CM

## 2022-11-08 DIAGNOSIS — N92.6 IRREGULAR MENSTRUAL CYCLE: ICD-10-CM

## 2022-11-08 DIAGNOSIS — L70.9 ACNE, UNSPECIFIED ACNE TYPE: ICD-10-CM

## 2022-11-08 DIAGNOSIS — Z13.1 DIABETES MELLITUS SCREENING: ICD-10-CM

## 2022-11-08 DIAGNOSIS — Z13.29 THYROID DISORDER SCREENING: ICD-10-CM

## 2022-11-08 DIAGNOSIS — R79.89 HIGH SERUM CORTISOL: ICD-10-CM

## 2022-11-08 PROCEDURE — 99244 OFF/OP CNSLTJ NEW/EST MOD 40: CPT | Performed by: INTERNAL MEDICINE

## 2022-11-08 PROCEDURE — 3079F DIAST BP 80-89 MM HG: CPT | Performed by: INTERNAL MEDICINE

## 2022-11-08 PROCEDURE — 3074F SYST BP LT 130 MM HG: CPT | Performed by: INTERNAL MEDICINE

## 2022-11-09 ENCOUNTER — LAB ENCOUNTER (OUTPATIENT)
Dept: LAB | Facility: HOSPITAL | Age: 22
End: 2022-11-09
Attending: INTERNAL MEDICINE
Payer: COMMERCIAL

## 2022-11-09 DIAGNOSIS — R79.89 HIGH SERUM CORTISOL: ICD-10-CM

## 2022-11-09 DIAGNOSIS — Z13.1 DIABETES MELLITUS SCREENING: ICD-10-CM

## 2022-11-09 DIAGNOSIS — L70.9 ACNE, UNSPECIFIED ACNE TYPE: ICD-10-CM

## 2022-11-09 DIAGNOSIS — Z13.29 THYROID DISORDER SCREENING: ICD-10-CM

## 2022-11-09 DIAGNOSIS — N92.6 IRREGULAR MENSTRUAL CYCLE: ICD-10-CM

## 2022-11-09 DIAGNOSIS — E78.5 DYSLIPIDEMIA: ICD-10-CM

## 2022-11-09 LAB
CHOLEST SERPL-MCNC: 169 MG/DL (ref ?–200)
CORTIS SERPL-MCNC: 14.4 UG/DL
DHEA-S SERPL-MCNC: 339.8 UG/DL
EST. AVERAGE GLUCOSE BLD GHB EST-MCNC: 117 MG/DL (ref 68–126)
ESTRADIOL SERPL-MCNC: 209.2 PG/ML
FASTING PATIENT LIPID ANSWER: YES
HBA1C MFR BLD: 5.7 % (ref ?–5.7)
HDLC SERPL-MCNC: 66 MG/DL (ref 40–59)
LDLC SERPL CALC-MCNC: 89 MG/DL (ref ?–100)
LH SERPL-ACNC: 95.7 MIU/ML
NONHDLC SERPL-MCNC: 103 MG/DL (ref ?–130)
PROLACTIN SERPL-MCNC: 10.3 NG/ML
TESTOST SERPL-MCNC: 50.2 NG/DL
TRIGL SERPL-MCNC: 76 MG/DL (ref 30–149)
TSI SER-ACNC: 1.32 MIU/ML (ref 0.36–3.74)
VLDLC SERPL CALC-MCNC: 12 MG/DL (ref 0–30)

## 2022-11-09 PROCEDURE — 83036 HEMOGLOBIN GLYCOSYLATED A1C: CPT

## 2022-11-09 PROCEDURE — 84443 ASSAY THYROID STIM HORMONE: CPT

## 2022-11-09 PROCEDURE — 83498 ASY HYDROXYPROGESTERONE 17-D: CPT

## 2022-11-09 PROCEDURE — 82627 DEHYDROEPIANDROSTERONE: CPT

## 2022-11-09 PROCEDURE — 84146 ASSAY OF PROLACTIN: CPT

## 2022-11-09 PROCEDURE — 83002 ASSAY OF GONADOTROPIN (LH): CPT

## 2022-11-09 PROCEDURE — 82670 ASSAY OF TOTAL ESTRADIOL: CPT

## 2022-11-09 PROCEDURE — 36415 COLL VENOUS BLD VENIPUNCTURE: CPT

## 2022-11-09 PROCEDURE — 84403 ASSAY OF TOTAL TESTOSTERONE: CPT

## 2022-11-09 PROCEDURE — 82533 TOTAL CORTISOL: CPT

## 2022-11-09 PROCEDURE — 80061 LIPID PANEL: CPT

## 2022-11-13 ENCOUNTER — PATIENT MESSAGE (OUTPATIENT)
Dept: ENDOCRINOLOGY CLINIC | Facility: CLINIC | Age: 22
End: 2022-11-13

## 2022-11-13 LAB — 17-HYDROPROGESTERONE QNT: 200.03 NG/DL

## 2022-11-13 RX ORDER — METFORMIN HYDROCHLORIDE 500 MG/1
TABLET, EXTENDED RELEASE ORAL
Qty: 332 TABLET | Refills: 0 | Status: SHIPPED | OUTPATIENT
Start: 2022-11-13 | End: 2023-02-11

## 2022-11-13 NOTE — TELEPHONE ENCOUNTER
From: Blake Hutchison  To: Alejandrina Spain MD  Sent: 11/13/2022 7:37 AM CST  Subject: Metformin    Hi, I would like to try Metformin and see if this works for me. Could you please let me know if this is a good option after seeing the test results from my current blood work? Thanks.

## 2022-11-22 ENCOUNTER — TELEPHONE (OUTPATIENT)
Dept: ENDOCRINOLOGY CLINIC | Facility: CLINIC | Age: 22
End: 2022-11-22

## 2022-11-22 NOTE — TELEPHONE ENCOUNTER
Dr. Mana Parham,     Please advise. Patient started Metformin yesterday     Felt symptoms of light-headedness, dizziness, and feeling faint. Administered 500mg in am- asymptomatic  Administered 500mg at night- symptoms began     Patient had not taken this morning dose due to feeling those symptoms. Patient is feeling okay right now.

## 2022-11-22 NOTE — TELEPHONE ENCOUNTER
Patient states she is taking medication, metFORMIN  MG Oral Tablet 24 Hr and it is making her feel dizzy and light headed.

## 2022-11-22 NOTE — TELEPHONE ENCOUNTER
Spoke to patient regarding Dr. Travis Escalante instructions below. Patient verbalized understanding, stating she will call office with an update.

## 2023-01-16 ENCOUNTER — OFFICE VISIT (OUTPATIENT)
Dept: INTERNAL MEDICINE CLINIC | Facility: CLINIC | Age: 23
End: 2023-01-16

## 2023-01-16 ENCOUNTER — NURSE TRIAGE (OUTPATIENT)
Dept: INTERNAL MEDICINE CLINIC | Facility: CLINIC | Age: 23
End: 2023-01-16

## 2023-01-16 VITALS
SYSTOLIC BLOOD PRESSURE: 129 MMHG | BODY MASS INDEX: 35.7 KG/M2 | WEIGHT: 199 LBS | DIASTOLIC BLOOD PRESSURE: 82 MMHG | HEART RATE: 105 BPM | HEIGHT: 62.5 IN

## 2023-01-16 DIAGNOSIS — L20.9 ATOPIC DERMATITIS, UNSPECIFIED TYPE: Primary | ICD-10-CM

## 2023-01-24 NOTE — TELEPHONE ENCOUNTER
Mom states she has the Ophthalmologist information for referral.  Wants to make sure this is for bilateral cloudiness along with eye health. Need to be seen by this doctor before contacts can be ordered. Dr. Olegario Hernandez. [facility]  [id number]  [facility]: [phone]  FAX: [phone]       Pended. VITAL SIGNS:  T(C): 36.6 (01-24-23 @ 07:15), Max: 36.9 (01-23-23 @ 22:32)  T(F): 97.9 (01-24-23 @ 07:15), Max: 98.4 (01-23-23 @ 22:32)  HR: 68 (01-24-23 @ 07:15) (64 - 168)  BP: 149/78 (01-24-23 @ 07:15) (129/91 - 153/97)  RR: 18 (01-24-23 @ 07:15) (18 - 20)  SpO2: 98% (01-24-23 @ 07:15) (97% - 98%)    PHYSICAL EXAM:  Constitutional: WDWN resting comfortably in bed; NAD  Head: NC/AT  Eyes: PERRL, EOMI, anicteric sclera  ENT: no nasal discharge; uvula midline, no oropharyngeal erythema or exudates; MMM  Neck: supple; no JVD or thyromegaly  Respiratory: CTA B/L; no W/R/R, no retractions  Cardiac: irregular rate at regular rhythm; no M/R/G; PMI non-displaced  Gastrointestinal: abdomen soft, NT/ND; no rebound or guarding; +BSx4  Back: spine midline, no bony tenderness or step-offs; no CVAT B/L  Extremities: WWP, no clubbing or cyanosis; no peripheral edema  Musculoskeletal: NROM x4; no joint swelling, tenderness or erythema  Vascular: 2+ radial, femoral, DP/PT pulses B/L  Dermatologic: skin warm, dry and intact; no rashes, wounds, or scars  Lymphatic: no submandibular or cervical LAD  Neurologic: AAOx3; CNII-XII grossly intact; no focal deficits  Psychiatric: affect and characteristics of appearance, verbalizations, behaviors are appropriate

## 2023-02-01 ENCOUNTER — TELEMEDICINE (OUTPATIENT)
Dept: INTERNAL MEDICINE CLINIC | Facility: CLINIC | Age: 23
End: 2023-02-01

## 2023-02-01 DIAGNOSIS — J02.9 ACUTE PHARYNGITIS, UNSPECIFIED ETIOLOGY: Primary | ICD-10-CM

## 2023-02-01 PROCEDURE — 99213 OFFICE O/P EST LOW 20 MIN: CPT | Performed by: NURSE PRACTITIONER

## 2023-02-01 RX ORDER — AZITHROMYCIN 250 MG/1
TABLET, FILM COATED ORAL
Qty: 6 TABLET | Refills: 0 | Status: SHIPPED | OUTPATIENT
Start: 2023-02-01 | End: 2023-02-06

## 2023-02-01 NOTE — ASSESSMENT & PLAN NOTE
URI with worsening pharyngitis, fever, and ear pain. Ill appearing on video. Plan  Patient with flu symptoms. Plan  Hydrate with fluids  Tylenol two tabs every six hours. . Not to exceed 4 grams in one day. Drink Hot tea with lemon  Drink Hot tea with honey  Gargle with warm salt water if you have a sore throat. Tylenol two tabs every six hours x 3 days. Not to exceed 4 grams in one day.   Mateo Navarro

## 2023-02-04 ENCOUNTER — OFFICE VISIT (OUTPATIENT)
Dept: INTERNAL MEDICINE CLINIC | Facility: CLINIC | Age: 23
End: 2023-02-04

## 2023-02-04 ENCOUNTER — TELEPHONE (OUTPATIENT)
Dept: INTERNAL MEDICINE CLINIC | Facility: CLINIC | Age: 23
End: 2023-02-04

## 2023-02-04 VITALS
HEIGHT: 62.5 IN | HEART RATE: 97 BPM | BODY MASS INDEX: 36.06 KG/M2 | OXYGEN SATURATION: 100 % | SYSTOLIC BLOOD PRESSURE: 116 MMHG | DIASTOLIC BLOOD PRESSURE: 80 MMHG | WEIGHT: 201 LBS

## 2023-02-04 DIAGNOSIS — H65.111 ACUTE MUCOID OTITIS MEDIA OF RIGHT EAR: Primary | ICD-10-CM

## 2023-02-04 PROCEDURE — 99213 OFFICE O/P EST LOW 20 MIN: CPT | Performed by: INTERNAL MEDICINE

## 2023-02-04 PROCEDURE — 3079F DIAST BP 80-89 MM HG: CPT | Performed by: INTERNAL MEDICINE

## 2023-02-04 PROCEDURE — 3074F SYST BP LT 130 MM HG: CPT | Performed by: INTERNAL MEDICINE

## 2023-02-04 PROCEDURE — 3008F BODY MASS INDEX DOCD: CPT | Performed by: INTERNAL MEDICINE

## 2023-02-04 RX ORDER — AMOXICILLIN AND CLAVULANATE POTASSIUM 875; 125 MG/1; MG/1
1 TABLET, FILM COATED ORAL 2 TIMES DAILY
Qty: 20 TABLET | Refills: 0 | Status: SHIPPED | OUTPATIENT
Start: 2023-02-04 | End: 2023-02-14

## 2023-02-04 NOTE — TELEPHONE ENCOUNTER
Patient had recent telemed on 2/1/23 with LOUIE Moore. Her mother is calling today as patient is almost finished with Zpack and her ears are hurting and feeling \"clogged. \" Mom is asking for an office visit for her. Scheduled with Dr. Lisa Vila for 10am today.

## 2023-02-07 ENCOUNTER — NURSE TRIAGE (OUTPATIENT)
Dept: INTERNAL MEDICINE CLINIC | Facility: CLINIC | Age: 23
End: 2023-02-07

## 2023-02-07 ENCOUNTER — TELEPHONE (OUTPATIENT)
Dept: INTERNAL MEDICINE CLINIC | Facility: CLINIC | Age: 23
End: 2023-02-07

## 2023-02-07 RX ORDER — FLUCONAZOLE 150 MG/1
150 TABLET ORAL ONCE
Qty: 2 TABLET | Refills: 0 | Status: SHIPPED | OUTPATIENT
Start: 2023-02-07 | End: 2023-02-07

## 2023-02-07 NOTE — TELEPHONE ENCOUNTER
Pt's mother calling, on RHEA verified name and . Requesting for a referral for ENT  Per mom, Pt was already seen by provider. Chart reviewed, informed mother, ENT referral has been authorized and ok to schedule an appointment with ENT.

## 2023-02-09 ENCOUNTER — OFFICE VISIT (OUTPATIENT)
Dept: AUDIOLOGY | Facility: CLINIC | Age: 23
End: 2023-02-09

## 2023-02-09 ENCOUNTER — OFFICE VISIT (OUTPATIENT)
Dept: OTOLARYNGOLOGY | Facility: CLINIC | Age: 23
End: 2023-02-09

## 2023-02-09 ENCOUNTER — PATIENT MESSAGE (OUTPATIENT)
Dept: OTOLARYNGOLOGY | Facility: CLINIC | Age: 23
End: 2023-02-09

## 2023-02-09 DIAGNOSIS — H91.90 HEARING LOSS, UNSPECIFIED HEARING LOSS TYPE, UNSPECIFIED LATERALITY: Primary | ICD-10-CM

## 2023-02-09 DIAGNOSIS — H90.11 CONDUCTIVE HEARING LOSS OF RIGHT EAR WITH UNRESTRICTED HEARING OF LEFT EAR: Primary | ICD-10-CM

## 2023-02-09 PROCEDURE — 92557 COMPREHENSIVE HEARING TEST: CPT | Performed by: AUDIOLOGIST

## 2023-02-09 PROCEDURE — 99243 OFF/OP CNSLTJ NEW/EST LOW 30: CPT | Performed by: OTOLARYNGOLOGY

## 2023-02-09 PROCEDURE — 92567 TYMPANOMETRY: CPT | Performed by: AUDIOLOGIST

## 2023-02-09 RX ORDER — METHYLPREDNISOLONE 4 MG/1
TABLET ORAL
Qty: 21 TABLET | Refills: 0 | Status: SHIPPED | OUTPATIENT
Start: 2023-02-09

## 2023-02-09 RX ORDER — PSEUDOEPHEDRINE HCL 120 MG/1
120 TABLET, FILM COATED, EXTENDED RELEASE ORAL EVERY 12 HOURS
Qty: 60 TABLET | Refills: 3 | Status: SHIPPED | OUTPATIENT
Start: 2023-02-09

## 2023-02-09 RX ORDER — CYCLOBENZAPRINE HCL 5 MG
5 TABLET ORAL NIGHTLY
Qty: 30 TABLET | Refills: 1 | Status: SHIPPED | OUTPATIENT
Start: 2023-02-09 | End: 2023-02-09

## 2023-02-09 NOTE — TELEPHONE ENCOUNTER
From: Jeff Cook  To: Tigist Lanier. Shadi Hoskins MD  Sent: 2/9/2023 9:37 AM CST  Subject: Antibiotics     Hi, I was wondering if I should still take the Amoxicillin antibiotics for the rest of the 5 days I have left along with the steroid I was prescribed today? Also, can I use XLear nasal spray instead of Flonase? Thanks.

## 2023-02-10 ENCOUNTER — PATIENT MESSAGE (OUTPATIENT)
Dept: INTERNAL MEDICINE CLINIC | Facility: CLINIC | Age: 23
End: 2023-02-10

## 2023-02-15 ENCOUNTER — PATIENT MESSAGE (OUTPATIENT)
Dept: OTOLARYNGOLOGY | Facility: CLINIC | Age: 23
End: 2023-02-15

## 2023-02-16 NOTE — TELEPHONE ENCOUNTER
From: Rohith Wilkinson  To: Ney Hammond. Sudarshan Hull MD  Sent: 2/15/2023 5:49 PM CST  Subject: Ears plugged up again    Hi, I just finished the 6 day steroid and I have been getting a cough and my throat has been hurting and my ears are starting to feel plugged up again. The right ear did clear up a lot when I was on the steroid but only after 2 days of being off of it my right ear seems to have some pressure again. Should I still continue to take the nasal decongestant every 12 hours as well as the nasal spray?

## 2023-02-16 NOTE — TELEPHONE ENCOUNTER
My chart message sent to patient to continue the nasal decongestant and spray. Patient has finished the 6 day course of steroids, but her right ear has pressure again. Dr. Heidi Marshall, please review and advise. Thank you.

## 2023-02-17 ENCOUNTER — PATIENT MESSAGE (OUTPATIENT)
Dept: INTERNAL MEDICINE CLINIC | Facility: CLINIC | Age: 23
End: 2023-02-17

## 2023-02-17 DIAGNOSIS — H65.111 ACUTE MUCOID OTITIS MEDIA OF RIGHT EAR: ICD-10-CM

## 2023-02-17 DIAGNOSIS — H66.91 RIGHT OTITIS MEDIA, UNSPECIFIED OTITIS MEDIA TYPE: Primary | ICD-10-CM

## 2023-02-18 NOTE — TELEPHONE ENCOUNTER
Micki Andersen NP =see message,referral pended. Future Appointments   Date Time Provider Mehul Niurka   3/7/2023  7:30 AM Denita Apley, MD [address] [facility]         From: Robyn Bundy  To: CITLALI Nguyen  Sent: 2/17/2023  8:57 AM CST  Subject: Ent specialist referral     Hi, I was told to get another referral to dr Paola Almanzar to follow up with the fluid that was in my ear. Could you please send the referral to his office so I can schedule my follow up appointment? Thanks.

## 2023-02-19 ENCOUNTER — PATIENT MESSAGE (OUTPATIENT)
Dept: OBGYN CLINIC | Facility: CLINIC | Age: 23
End: 2023-02-19

## 2023-02-20 ENCOUNTER — TELEPHONE (OUTPATIENT)
Dept: OBGYN CLINIC | Facility: CLINIC | Age: 23
End: 2023-02-20

## 2023-02-20 NOTE — TELEPHONE ENCOUNTER
From: Blake Hutchison  To: Diana Mi MD  Sent: 2/19/2023 11:13 AM CST  Subject: Birth control    Hi, I want to get back on the birth control I was on, which was DROSPIRENONE-ETHINYL  ESTRADIOL 3-0.03 MG. My main use of this is for acne and irregular periods which it helped with. Do you think I should be on a lower dose or stick with the original dose? I would need a new refill to the [facility]. Thanks.

## 2023-02-20 NOTE — TELEPHONE ENCOUNTER
Pts last annual was 7/2020 with [facility]  Next annual is scheduled for 6/2/23  Pt had annual was scheduled with [facility] on 1/18/21 and 1/25/22 for annual but cancelled. Message to [facility] if OK to refill OCP to cover pt until annual in June?

## 2023-02-20 NOTE — TELEPHONE ENCOUNTER
Cannot refill ocps. Last seen in July 2020. I give flexibility for 6 months past annual due date. She is over that point by one year even.  Find out why appts were cancelled & rationale for waiting until June to followup for annual.

## 2023-02-20 NOTE — TELEPHONE ENCOUNTER
Pt made appt for ASAP with Deneen Shield in June, but is asking if she can get b/c in the mean time to cover until then.     Pls advise

## 2023-02-21 NOTE — TELEPHONE ENCOUNTER
Pt notified of [address] message. Pt states she is stopped BC about one year ago and wants to restart. Informed pt will need to be seen. Pt accepts appt on 2/23 at [facility].

## 2023-02-23 ENCOUNTER — OFFICE VISIT (OUTPATIENT)
Dept: OBGYN CLINIC | Facility: CLINIC | Age: 23
End: 2023-02-23

## 2023-02-23 ENCOUNTER — TELEPHONE (OUTPATIENT)
Dept: OBGYN CLINIC | Facility: CLINIC | Age: 23
End: 2023-02-23

## 2023-02-23 VITALS
BODY MASS INDEX: 36.35 KG/M2 | SYSTOLIC BLOOD PRESSURE: 134 MMHG | DIASTOLIC BLOOD PRESSURE: 86 MMHG | WEIGHT: 195 LBS | HEIGHT: 61.5 IN | HEART RATE: 99 BPM

## 2023-02-23 DIAGNOSIS — Z30.09 BIRTH CONTROL COUNSELING: ICD-10-CM

## 2023-02-23 DIAGNOSIS — N63.41 SUBAREOLAR MASS OF RIGHT BREAST: ICD-10-CM

## 2023-02-23 DIAGNOSIS — Z01.411 ENCOUNTER FOR GYNECOLOGICAL EXAMINATION WITH ABNORMAL FINDING: Primary | ICD-10-CM

## 2023-02-23 PROBLEM — J02.9 ACUTE PHARYNGITIS: Status: RESOLVED | Noted: 2023-02-01 | Resolved: 2023-02-23

## 2023-02-23 PROBLEM — Z00.00 ROUTINE PHYSICAL EXAMINATION: Status: RESOLVED | Noted: 2019-08-27 | Resolved: 2023-02-23

## 2023-02-23 PROCEDURE — 3075F SYST BP GE 130 - 139MM HG: CPT | Performed by: OBSTETRICS & GYNECOLOGY

## 2023-02-23 PROCEDURE — 3079F DIAST BP 80-89 MM HG: CPT | Performed by: OBSTETRICS & GYNECOLOGY

## 2023-02-23 PROCEDURE — 3008F BODY MASS INDEX DOCD: CPT | Performed by: OBSTETRICS & GYNECOLOGY

## 2023-02-23 PROCEDURE — 99395 PREV VISIT EST AGE 18-39: CPT | Performed by: OBSTETRICS & GYNECOLOGY

## 2023-02-23 RX ORDER — DROSPIRENONE AND ETHINYL ESTRADIOL 0.03MG-3MG
1 KIT ORAL DAILY
Qty: 84 TABLET | Refills: 3 | Status: SHIPPED | OUTPATIENT
Start: 2023-02-23 | End: 2024-02-23

## 2023-02-23 NOTE — TELEPHONE ENCOUNTER
Pt called and informed of [facility] rec's. Pt informed if noting HA's or vision issues to reach out to office. That we want to see her when she start her 4th pack of active pills for f/u on how she is doing on them and BP check. Pt states understanding, declines to schedule f/u now, states she will call on the first day of active pills for her 4th pack. She also indicates she will reach out sooner if any issues.

## 2023-02-23 NOTE — TELEPHONE ENCOUNTER
Noticed borderline BP after pt gone.   Pt will be restarting ocps beg of March  Needs BP check while on fourth pack on RN schedule or w/ me as office f/u

## 2023-03-07 ENCOUNTER — OFFICE VISIT (OUTPATIENT)
Dept: OTOLARYNGOLOGY | Facility: CLINIC | Age: 23
End: 2023-03-07

## 2023-03-07 VITALS — BODY MASS INDEX: 34.55 KG/M2 | HEIGHT: 63 IN | TEMPERATURE: 96 F | WEIGHT: 195 LBS

## 2023-03-07 DIAGNOSIS — H69.83 DYSFUNCTION OF BOTH EUSTACHIAN TUBES: Primary | ICD-10-CM

## 2023-03-07 PROCEDURE — 3008F BODY MASS INDEX DOCD: CPT | Performed by: OTOLARYNGOLOGY

## 2023-03-07 PROCEDURE — 99213 OFFICE O/P EST LOW 20 MIN: CPT | Performed by: OTOLARYNGOLOGY

## 2023-03-11 PROBLEM — F90.9 ADHD: Status: ACTIVE | Noted: 2023-03-11

## 2023-04-14 ENCOUNTER — PATIENT MESSAGE (OUTPATIENT)
Dept: OBGYN CLINIC | Facility: CLINIC | Age: 23
End: 2023-04-14

## 2023-04-14 DIAGNOSIS — Z11.3 SCREENING FOR STDS (SEXUALLY TRANSMITTED DISEASES): Primary | ICD-10-CM

## 2023-04-15 NOTE — TELEPHONE ENCOUNTER
From: Kaia Field  Sent: 4/15/2023 7:15 AM CDT  To: Delores [facility] Clinical Staff  Subject: STD testing     If you could please send a message to her for labs that would be great. Thank you.

## 2023-04-15 NOTE — TELEPHONE ENCOUNTER
Please see pt's mychart request for STD testing. Pt's LA was on 2/23/23.  Last pap 7/10/20 normal. Msg to [facility] to advise further

## 2023-04-20 ENCOUNTER — LAB ENCOUNTER (OUTPATIENT)
Dept: LAB | Facility: HOSPITAL | Age: 23
End: 2023-04-20
Attending: OBSTETRICS & GYNECOLOGY
Payer: COMMERCIAL

## 2023-04-20 DIAGNOSIS — Z11.3 SCREENING FOR STDS (SEXUALLY TRANSMITTED DISEASES): ICD-10-CM

## 2023-04-20 LAB
HBV SURFACE AG SER-ACNC: <0.1 [IU]/L
HBV SURFACE AG SERPL QL IA: NONREACTIVE
HCV AB SERPL QL IA: NONREACTIVE

## 2023-04-20 PROCEDURE — 87389 HIV-1 AG W/HIV-1&-2 AB AG IA: CPT

## 2023-04-20 PROCEDURE — 86780 TREPONEMA PALLIDUM: CPT

## 2023-04-20 PROCEDURE — 36415 COLL VENOUS BLD VENIPUNCTURE: CPT

## 2023-04-20 PROCEDURE — 87591 N.GONORRHOEAE DNA AMP PROB: CPT

## 2023-04-20 PROCEDURE — 87491 CHLMYD TRACH DNA AMP PROBE: CPT

## 2023-04-20 PROCEDURE — 87340 HEPATITIS B SURFACE AG IA: CPT

## 2023-04-20 PROCEDURE — 86803 HEPATITIS C AB TEST: CPT

## 2023-04-21 LAB
C TRACH DNA SPEC QL NAA+PROBE: NEGATIVE
N GONORRHOEA DNA SPEC QL NAA+PROBE: NEGATIVE
T PALLIDUM AB SER QL: NEGATIVE

## 2023-07-18 ENCOUNTER — TELEPHONE (OUTPATIENT)
Dept: INTERNAL MEDICINE CLINIC | Facility: CLINIC | Age: 23
End: 2023-07-18

## 2023-07-18 NOTE — TELEPHONE ENCOUNTER
Patient calling ( identified name and  ) asking for referral for Ophthalmology     Last referral from 2022 was for 3 visits    Called  office spoke with Jelani White . explined the above and Jelani White states to fax the referral to [phone]    Referral sent via Right fax     Called patient left message that referral was faxed to

## 2023-07-30 ENCOUNTER — PATIENT MESSAGE (OUTPATIENT)
Dept: INTERNAL MEDICINE CLINIC | Facility: CLINIC | Age: 23
End: 2023-07-30

## 2023-07-31 NOTE — TELEPHONE ENCOUNTER
From: Perez Lynn  To: CITLALI Parrish  Sent: 7/30/2023 6:56 PM CDT  Subject: Request for referral to ophthalmologist     I need a medical referral for my yearly eye exam with an ophthalmologist. Please see my previous yearly history. The doctor is Dr. Modesto Menard, [phone]. Office is located in [facility]. Thank you.

## 2023-10-11 RX ORDER — DROSPIRENONE AND ETHINYL ESTRADIOL 0.03MG-3MG
1 KIT ORAL DAILY
Qty: 84 TABLET | Refills: 3 | OUTPATIENT
Start: 2023-10-11 | End: 2024-10-10

## 2023-10-13 ENCOUNTER — TELEPHONE (OUTPATIENT)
Dept: OBGYN CLINIC | Facility: CLINIC | Age: 23
End: 2023-10-13

## 2023-10-13 NOTE — TELEPHONE ENCOUNTER
Last annual - 2/23/2023  Birth control pill rx for #84 with 3 refills was sent that day. Pt should still have refills. Next annual is scheduled on 2/26/2024. Pt states the pharmacy told her she only has one refill of 3 packs left. This will only last until January. Pt advised if we send in added refills now it may mess up her current rx. Pt instructed to call in early January and we can send in refills to cover until annual in February. Pt expressed understanding.

## 2023-10-25 ENCOUNTER — OFFICE VISIT (OUTPATIENT)
Dept: OBGYN CLINIC | Facility: CLINIC | Age: 23
End: 2023-10-25

## 2023-10-25 VITALS
HEART RATE: 91 BPM | BODY MASS INDEX: 33 KG/M2 | WEIGHT: 189 LBS | SYSTOLIC BLOOD PRESSURE: 125 MMHG | DIASTOLIC BLOOD PRESSURE: 85 MMHG

## 2023-10-25 DIAGNOSIS — N63.41 SUBAREOLAR MASS OF RIGHT BREAST: Primary | ICD-10-CM

## 2023-10-25 PROCEDURE — 3079F DIAST BP 80-89 MM HG: CPT | Performed by: OBSTETRICS & GYNECOLOGY

## 2023-10-25 PROCEDURE — 99213 OFFICE O/P EST LOW 20 MIN: CPT | Performed by: OBSTETRICS & GYNECOLOGY

## 2023-10-25 PROCEDURE — 3074F SYST BP LT 130 MM HG: CPT | Performed by: OBSTETRICS & GYNECOLOGY

## 2023-11-02 ENCOUNTER — HOSPITAL ENCOUNTER (OUTPATIENT)
Dept: ULTRASOUND IMAGING | Facility: HOSPITAL | Age: 23
Discharge: HOME | End: 2023-11-02
Attending: OBSTETRICS & GYNECOLOGY
Payer: COMMERCIAL

## 2023-11-02 DIAGNOSIS — N63.41 SUBAREOLAR MASS OF RIGHT BREAST: ICD-10-CM

## 2023-11-02 PROCEDURE — 76642 ULTRASOUND BREAST LIMITED: CPT | Performed by: OBSTETRICS & GYNECOLOGY

## 2023-11-02 NOTE — IMAGING NOTE
Hugh Martinez was informed she will now yasmin to have a surgical consult for excision on due to Bx site has now increased in size. 848 am I contacted Dr Rockne Phoenix' nurse [facility] inform surgeon name need I was provided Dr Cali Mancia and Dr Herve Perez. Hugh Martinez was provided both surgeons profiles. name would like to see Dr Cali Mancia. She was instructed to call and make an appt. Hugh Martinez verbalizes understanding and agreement I sent a staff message to Dr Rochelle Renae staff also.

## 2023-11-08 ENCOUNTER — TELEPHONE (OUTPATIENT)
Dept: OBGYN CLINIC | Facility: CLINIC | Age: 23
End: 2023-11-08

## 2023-11-08 NOTE — TELEPHONE ENCOUNTER
----- Message from Conner Win MD sent at 11/6/2023 11:47 PM CST -----  Needs to proceed in seeing Dr. Petra Flower for enlarging fibroadenoma -- I believe I gave pt business card at visit. I do not see appt pending.  May need to get referral from PCP

## 2024-01-09 RX ORDER — DROSPIRENONE AND ETHINYL ESTRADIOL 0.03MG-3MG
1 KIT ORAL DAILY
Qty: 84 TABLET | Refills: 0 | Status: SHIPPED | OUTPATIENT
Start: 2024-01-09

## 2024-01-09 NOTE — TELEPHONE ENCOUNTER
Requested Prescriptions     Pending Prescriptions Disp Refills    DROSPIRENONE-ETHINYL ESTRADIOL 3-0.03 MG Oral Tab [Pharmacy Med Name: DROSPIRENONE-EE 3-0.03 MG TAB] 84 tablet 3     Sig: TAKE 1 TABLET BY MOUTH EVERY DAY     Last annual 2/23/23  Last filled 2/23/23 x 1 year  Pap due at annual.     Next annual 2/26/24

## 2024-02-21 ENCOUNTER — OFFICE VISIT (OUTPATIENT)
Dept: SURGERY | Facility: CLINIC | Age: 24
End: 2024-02-21
Payer: COMMERCIAL

## 2024-02-21 VITALS
RESPIRATION RATE: 17 BRPM | SYSTOLIC BLOOD PRESSURE: 134 MMHG | HEART RATE: 73 BPM | TEMPERATURE: 98 F | BODY MASS INDEX: 24.98 KG/M2 | HEIGHT: 63 IN | OXYGEN SATURATION: 99 % | WEIGHT: 141 LBS | DIASTOLIC BLOOD PRESSURE: 82 MMHG

## 2024-02-21 DIAGNOSIS — D24.1 FIBROADENOMA OF RIGHT BREAST: Primary | ICD-10-CM

## 2024-02-21 PROCEDURE — 3075F SYST BP GE 130 - 139MM HG: CPT | Performed by: SURGERY

## 2024-02-21 PROCEDURE — 3008F BODY MASS INDEX DOCD: CPT | Performed by: SURGERY

## 2024-02-21 PROCEDURE — 99204 OFFICE O/P NEW MOD 45 MIN: CPT | Performed by: SURGERY

## 2024-02-21 PROCEDURE — 3079F DIAST BP 80-89 MM HG: CPT | Performed by: SURGERY

## 2024-02-21 NOTE — PATIENT INSTRUCTIONS
Dr. Laura Bolton  Tel: [phone]  Fax: [phone] [facility]  [address] [place], [place] [id number] [phone]     Surgery/Procedure: Excision of right breast mass     Anesthesia:   MAC  Surgery Length:   45 minutes CPT:  50693   Wire LOC:   No   Nuc Med:   No   Soraya Seed:  No       Dx & ICD-10: Fibroadenoma of right breast [D24.1]    Radiology Instructions: N/A   _______________________________________________________________________________    Someone must accompany you the day of the procedure to drive you home safely, because of anesthesia.  You will need an adult  to stay with you the first night following your surgery.  You must remove any kind of makeup, acrylic nails, lotions, powders, creams or deodorant.  [facility] ONLY: Pre-admission will give instruct you on when to take Gatorade and Tylenol/acetaminophen prior to your surgery, purchase 2 - 12oz bottles of regular Gatorade (NOT RED/SUGAR FREE). Otherwise, you may not eat or drink anything else after 11PM the night before surgery.    [facility]: You may not eat or drink anything after midnight the day of your surgery.   Wear comfortable clothing that can be easily removed.  If you wear dentures, contacts lenses, or any prosthesis, you will be asked to remove them.  Do not drink alcohol or smoke 24 hours prior to your procedure.  Bring a picture ID and your insurance card.  Covid-19 testing is no longer required before surgery unless you are experiencing symptoms such as fever, cough, congestion, etc.   The Pre-Admission Testing Department will call the day before to confirm your procedure, give you the time you need to arrive by and directions on where to go. They begin making calls after 2pm, if you are not contacted by 4pm, please call the surgeon's office listed above.  Do not take any blood thinners at least one week prior to the procedure/surgery. This includes aspirin, baby aspirin, Ibuprofen products,  herbal supplements, diet medications, vitamin E, fish oil and green tea supplements. Please check other supplements for these ingredients. *TYLENOL or acetaminophen is acceptable*  If you take Coumadin, Plavix, Xarelto, or Eliquis, please contact your prescribing physician for special instructions on how long to hold. If you take insulin contact your primary care physician for special instructions.  Our surgery scheduler, Rosa, will be contacting you to discuss surgery dates. If you have any questions related to scheduling your surgery, please reach out to her at [phone].  _____________________________________________________________________  PRE-OPERATIVE TESTING IF INDICATED BELOW  PLEASE COMPLETE ASAP (AT LEAST 14-21 DAYS PRIOR TO SURGERY)  [] CBC [] BMP [] CMP [] EKG    [] PT, PTT, INR [] Cardiac Clearance  [] H&P Medical Clearance [] Chest X-ray     Please call Central Scheduling to schedule an appointment for pre-operative labs/tests @ [phone]  Does the patient have a pacemaker or ICD?           Does the patient have sleep apnea?  [] Yes   [x] No                               [] Yes   [x] No

## 2024-02-21 NOTE — PROGRESS NOTES
Breast Surgery New Patient Consultation    This is the first visit for this 23 year old woman, referred by Dr. Drake, who presents for evaluation of Right breast mass.    History of Present Illness:   Ms. Loida Benjamin is a 23 year old woman who presents with a self detected mass of the right breast.  The patient first noticed this about 6 years ago.  She had a biopsy done with marker placement back in 2018 that confirmed a fibroadenoma.  She says she has had increased size and symptomatology to this area recently.  She has no other personal prior history of breast disease or biopsies.  She does not have any known family history of breast cancer.  Recent right breast surveillance ultrasound on 2023 confirmed interval increase in size of the 6:00 subareolar mass up to 2.7 cm from 2.2 cm previously.  She denies any nipple discharge, skin changes or axillary symptoms.  She is here today for evaluation and recommendations for further therapy.        Past Medical History:   Diagnosis Date    Feeding difficulties and mismanagement 99    OTHER DISEASES 99    blocled tear duct    UTI   01       Past Surgical History:   Procedure Laterality Date    OTHER SURGICAL HISTORY Right 2018    right breast biopsy  - benign       Gynecological History:  Pt is a   She achieved menarche at age 11 and LMP 23  She denies any history of hormone replacement therapy.  She has history of oral contraceptive use for 8 years, currently using.  She denies infertility treatment to achieve pregnancy.    Medications:    No outpatient medications have been marked as taking for the 24 encounter (Appointment) with Laura Bolton MD.       Allergies:    No Known Allergies    Family History:   Family History   Problem Relation Age of Onset    High Blood Pressure Maternal Grandmother     Diabetes Maternal Grandmother     Glaucoma Maternal Grandmother     High Blood Pressure Maternal Grandfather      Pancreatic Cancer Maternal Grandfather 81    Cancer Paternal Grandfather 79        Stomach    Breast Cancer Maternal Aunt     Diabetes Other         paternal great cousin    Cancer Other         mggm colon    Cancer Other         mgg aunts breast    Cancer Paternal Uncle 58        Larynx    Macular degeneration Neg        She is not of Ashkenazi Amish ancestry.    Social History:  History   Alcohol Use Not Currently       History   Smoking Status    Never   Smokeless Tobacco    Never     Ms. Loida Benjamin is Single with 0 children. She has 0 siblings. She is currently Unemployed/seeking employment      Review of Systems:  General:   The patient denies, fever, chills, night sweats, fatigue, generalized weakness, change in appetite or weight loss.    HEENT:     The patient denies eye irritation, cataracts, redness, glaucoma, yellowing of the eyes, change in vision or color blindness. The patient denies hearing loss, ringing in the ears, ear drainage, earaches, nasal congestion, nose bleeds, snoring, pain in mouth/throat, hoarseness, change in voice, facial trauma. +glasses/contacts    Respiratory:  The patient denies chronic cough, phlegm, hemoptysis, pleurisy/chest pain, pneumonia, asthma, wheezing, difficulty in breathing with exertion, emphysema, chronic bronchitis, shortness of breath or abnormal sound when breathing.     Cardiovascular:  There is no history of chest pain, chest pressure/discomfort, palpitations, irregular heartbeat, fainting or near-fainting, difficulty breathing when lying flat, SOB/Coughing at night, swelling of the legs or chest pain while walking.    Breasts:  See history of present illness    Gastrointestinal:     There is no history of difficulty or pain with swallowing, reflux symptoms, vomiting, dark or bloody stools, constipation, yellowing of the skin, indigestion, nausea, change in bowel habits, diarrhea, abdominal pain or vomiting blood.     Genitourinary:  The patient denies  frequent urination, needing to get up at night to urinate, urinary hesitancy or retaining urine, painful urination, urinary incontinence, decreased urine stream, blood in the urine or vaginal/penile discharge.    Skin:    The patient denies rash, itching, skin lesions, dry skin, change in skin color or change in moles.     Hematologic/Lymphatic:  The patient denies easily bruising or bleeding or persistent swollen glands or lymph nodes.     Musculoskeletal:  The patient denies muscle aches/pain, joint pain, stiff joints, neck pain, back pain or bone pain.    Neuropsychiatric:  There is no history of migraines or severe headaches, seizure/epilepsy, speech problems, coordination problems, trembling/tremors, fainting/black outs, dizziness, memory problems, loss of sensation/numbness, problems walking, weakness, tingling or burning in hands/feet. There is no history of abusive relationship, bipolar disorder, sleep disturbance, +anxiety, depression or feeling of despair.    Endocrine:    There is no history of poor/slow wound healing, weight loss/gain, fertility or hormone problems, cold intolerance, thyroid disease.     Allergic/Immunologic:  There is no history of hives, hay fever, angioedema or anaphylaxis.    /82 (BP Location: Left arm, Patient Position: Sitting)   Pulse 73   Temp 98.1 °F (36.7 °C) (Temporal)   Resp 17   Ht 1.6 m (5' 3\")   Wt 64 kg (141 lb)   LMP 10/08/2023 (Exact Date)   SpO2 99%   BMI 24.98 kg/m²     Physical Exam:  The patient is an alert, oriented, well-nourished and  well-developed woman who appears her stated age. Her speech patterns and movements are normal. Her affect is appropriate.    HEENT: The head is normocephalic. The neck is supple. The thyroid is not enlarged and is without palpable masses/nodules. There are no palpable masses. The trachea is in the midline. Conjunctiva are clear, non-icteric.    Chest: The chest expands symmetrically. The lungs are clear to  auscultation.    Heart: The rhythm is regular.  There are no murmurs, rubs, gallops or thrills.    Breasts:  Her breasts are symmetrical with a cup size 36DD.  Right breast: The skin, nipple ,and areola appear normal. There is no skin dimpling with movement of the pectoralis. There is no nipple retraction. No nipple discharge can be elicited. The parenchyma is mildly nodular. There is a palpable smooth and mobile encapsulated mass measuring proxy 2 x 2 cm in the immediate 6:00 subareolar region.  The axillary tail is normal.  Left breast:   The skin, nipple, and areola appear normal. There is no skin dimpling with movement of the pectoralis. There is no nipple retraction. No nipple discharge can be elicited. The parenchyma is mildly nodular. There are no dominant masses in the breast. The axillary tail is normal.    Abdomen:  The abdomen is soft, flat and non tender. The liver is not enlarged. There are no palpable masses.    Lymph Nodes:  The supraclavicular, axillary and cervical regions are free of significant lymphadenopathy.    Back: There is no vertebral column tenderness.    Skin: The skin appears normal. There are no suspicious appearing rashes or lesions.    Extremities: The extremities are without deformity, cyanosis or edema.    Impression:   Ms. Loida Benjamin is a 23 year old woman presents with an enlarging, biopsy-proven right breast fibroadenoma.    Discussion and Plan:  I had a discussion with the Patient regarding her breast exam. On exam today I found palpable mass corresponding the biopsy confirmed fibroadenoma in the right breast with no other clinical findings bilaterally.  I personally reviewed the recent imaging and prior pathology we discussed this at length.    We did discuss that fibroadenomas are not thought to be direct precursor lesions nor are they considered to be markers of a higher risk for developing breast cancer in the future. However, fibroadenomas can grown with time and  become symptomatic which can prompt surgical excision. Growth of the lesion over time warrants excision to distinguish the lesion from a proliferative Phyllodes tumor. In light of the fact that this is increased in size and symptomatology I recommend excision of the right breast mass.   The risks and possible complications of the procedure were explained to the patient and her family and she understood and agreed to the proposed plan. She was given ample opportunity for questions and those questions were answered to her satisfaction. She has been  encouraged to contact the office with any questions or concerns prior to her next appointment.

## 2024-02-22 ENCOUNTER — TELEPHONE (OUTPATIENT)
Dept: SURGERY | Facility: CLINIC | Age: 24
End: 2024-02-22

## 2024-02-26 ENCOUNTER — TELEPHONE (OUTPATIENT)
Dept: SURGERY | Facility: CLINIC | Age: 24
End: 2024-02-26

## 2024-02-26 DIAGNOSIS — D24.1 FIBROADENOMA OF RIGHT BREAST: Primary | ICD-10-CM

## 2024-02-26 NOTE — TELEPHONE ENCOUNTER
Calling pt in regards to scheduling surgery.  Informed pt that I have 03/04/2024 available patient requested 04/29/2024 at [facility] with Dr. Bolton.  Pt verbalized understanding and in agreement with date and location.  All questions answered.   Encouraged pt to call or MyChart message office with any other questions or concerns.

## 2024-03-19 ENCOUNTER — OFFICE VISIT (OUTPATIENT)
Dept: INTERNAL MEDICINE CLINIC | Facility: CLINIC | Age: 24
End: 2024-03-19

## 2024-03-19 VITALS
HEART RATE: 82 BPM | DIASTOLIC BLOOD PRESSURE: 87 MMHG | SYSTOLIC BLOOD PRESSURE: 138 MMHG | WEIGHT: 190 LBS | HEIGHT: 63 IN | BODY MASS INDEX: 33.66 KG/M2

## 2024-03-19 DIAGNOSIS — Z00.00 ANNUAL PHYSICAL EXAM: ICD-10-CM

## 2024-03-19 DIAGNOSIS — R10.10 PAIN OF UPPER ABDOMEN: ICD-10-CM

## 2024-03-19 DIAGNOSIS — T78.40XA ALLERGY, INITIAL ENCOUNTER: ICD-10-CM

## 2024-03-19 DIAGNOSIS — D24.1 FIBROADENOMA OF RIGHT BREAST: ICD-10-CM

## 2024-03-19 DIAGNOSIS — R10.11 RUQ PAIN: Primary | ICD-10-CM

## 2024-03-19 PROCEDURE — 3075F SYST BP GE 130 - 139MM HG: CPT | Performed by: NURSE PRACTITIONER

## 2024-03-19 PROCEDURE — 3079F DIAST BP 80-89 MM HG: CPT | Performed by: NURSE PRACTITIONER

## 2024-03-19 PROCEDURE — 3008F BODY MASS INDEX DOCD: CPT | Performed by: NURSE PRACTITIONER

## 2024-03-19 PROCEDURE — 99395 PREV VISIT EST AGE 18-39: CPT | Performed by: NURSE PRACTITIONER

## 2024-03-19 NOTE — ASSESSMENT & PLAN NOTE
Normal exam.  Labs as ordered. Requesting Food allergy panel  Skin check normal.  No significant abnormal nevi.    No cervical or inguinal lymphadenopathy.  Hernial orifices intact.    Immunizations- Up to date

## 2024-03-19 NOTE — PROGRESS NOTES
HPI:    Patient ID: Loida Benjamin is a 23 year old female.  Maxine Mccormack is not sexually active.    HPI Physical Exam  23 year old female is here for annual physical exam    Right side pain that started 3 months ago.  This pain has started since she has been walking 10,000 steps per day.  Pain is intermitted  It occurs randomly- not after a meal.  Pain level is 2/10.  No nausea or vomiting  No fever, chills, body aches or feeling ill.     Abdominal pain  Notices a pain in upper abdomen  Burping and tingling with chills.    Breast Surgery  She is scheduled to have right breast surgery for a increasing in size fibroma    Ears/Popping  Immunization History   Administered Date(s) Administered    >= 3 Yrs, Influenza Vaccine,(83889),[facility] 09/12/2009    DTAP 06/15/1999, 08/09/1999, 10/11/1999, 07/10/2000, 04/25/2003    HEP A 09/07/2011    HEP A,Ped/Adol,(2 Dose) 08/11/2016    HEP B 04/10/1999, 05/11/1999    HEP B/HIB 04/10/2000    HIB 06/15/1999, 08/09/1999, 10/11/1999    HPV (Gardasil) 07/25/2011, 09/07/2011    Hpv Virus Vaccine 9 Ellen Im 01/02/2016    IPV 06/15/1999, 08/09/1999, 07/10/2000, 04/25/2003    Influenza 08/11/2010, 09/07/2011    Influenza A (H1N1) 11/10/2009    MMR 04/10/2000, 04/30/2004    Meningococcal-Menactra 07/02/2010, 08/11/2016    Pneumococcal (Prevnar 7) 07/10/2000, 10/09/2000    Rotavirus 3 Dose 06/15/1999    TDAP 07/02/2010, 07/17/2020    Varicella 09/03/2002, 07/02/2010       Past Medical History:   Diagnosis Date    Feeding difficulties and mismanagement 04/14/99    OTHER DISEASES 05/11/99    blocled tear duct    UTI   09/06/01      Past Surgical History:   Procedure Laterality Date    Other surgical history Right 2018    right breast biopsy  - benign      Social History     Socioeconomic History    Marital status: Single   Tobacco Use    Smoking status: Never    Smokeless tobacco: Never   Vaping Use    Vaping Use: Never used   Substance and Sexual Activity    Alcohol use: Not Currently     Drug use: Never    Sexual activity: Not Currently          Review of Systems   Constitutional:  Negative for chills, fatigue and fever.   HENT:  Negative for congestion, ear discharge, ear pain, facial swelling, hearing loss, postnasal drip, sinus pressure, sore throat, trouble swallowing and voice change.    Eyes:  Negative for pain, discharge, redness and visual disturbance.   Respiratory:  Negative for cough, chest tightness, shortness of breath and wheezing.    Cardiovascular:  Negative for chest pain, palpitations and leg swelling.   Gastrointestinal:  Positive for abdominal pain. Negative for abdominal distention, blood in stool, constipation, diarrhea, nausea and vomiting.   Endocrine: Negative for cold intolerance, heat intolerance, polydipsia, polyphagia and polyuria.   Genitourinary:  Negative for difficulty urinating, dysuria, pelvic pain and vaginal bleeding.   Musculoskeletal:  Negative for back pain, gait problem, neck pain and neck stiffness.   Skin:  Negative for color change and rash.   Allergic/Immunologic: Negative for environmental allergies and food allergies.   Neurological:  Negative for dizziness, seizures, weakness and headaches.   Psychiatric/Behavioral:  Negative for agitation, decreased concentration, dysphoric mood and sleep disturbance. The patient is nervous/anxious.               Current Outpatient Medications   Medication Sig Dispense Refill    drospirenone-ethinyl estradiol 3-0.03 MG Oral Tab TAKE 1 TABLET BY MOUTH EVERY DAY 84 tablet 0    fluticasone propionate 50 MCG/ACT Nasal Suspension 1 spray by Nasal route 2 (two) times daily. (Patient not taking: Reported on 2/21/2024) 16 g 3    ALPRAZolam (XANAX) 0.25 MG Oral Tab Take 1 tablet (0.25 mg total) by mouth nightly as needed. (Patient not taking: Reported on 1/16/2023) 30 tablet 0     Allergies:No Known Allergies   PHYSICAL EXAM:   Physical Exam  /87 (BP Location: Right arm, Patient Position: Sitting, Cuff Size: adult)    Pulse 82   Ht 5' 3\" (1.6 m)   Wt 190 lb (86.2 kg)   LMP 03/01/2024 (Exact Date)   BMI 33.66 kg/m²   Wt Readings from Last 2 Encounters:   03/19/24 190 lb (86.2 kg)   02/21/24 191 lb (86.6 kg)     Body mass index is 33.66 kg/m².(2)  Lab Results   Component Value Date    WBC 8.5 08/29/2019    RBC 4.65 08/29/2019    HGB 13.0 08/29/2019    HCT 40.2 08/29/2019    MCV 86.5 08/29/2019    MCH 28.0 08/29/2019    MCHC 32.3 08/29/2019    RDW 12.6 08/29/2019    .0 08/29/2019    MPV 8.7 11/13/2018      Lab Results   Component Value Date    GLU 84 08/29/2019    BUN 10 08/29/2019    BUNCREA 11.8 08/29/2019    CREATSERUM 0.85 08/29/2019    ANIONGAP 10 08/29/2019    GFRNAA 99 08/29/2019    GFRAA 114 08/29/2019    CA 9.6 08/29/2019    OSMOCALC 288 08/29/2019    ALKPHO 81 08/29/2019    AST 10 (L) 08/29/2019    ALT 16 08/29/2019    BILT 0.6 08/29/2019    TP 8.1 08/29/2019    ALB 4.0 08/29/2019    GLOBULIN 4.1 08/29/2019     08/29/2019    K 4.0 08/29/2019     08/29/2019    CO2 24.0 08/29/2019      Lab Results   Component Value Date     11/09/2022    A1C 5.7 (H) 11/09/2022      Lab Results   Component Value Date    CHOLEST 169 11/09/2022    TRIG 76 11/09/2022    HDL 66 (H) 11/09/2022    LDL 89 11/09/2022    VLDL 12 11/09/2022    NONHDLC 103 11/09/2022      Lab Results   Component Value Date    TSH 1.320 11/09/2022                ASSESSMENT/PLAN:     Problem List Items Addressed This Visit       Annual physical exam     Normal exam.  Labs as ordered. Requesting Food allergy panel  Skin check normal.  No significant abnormal nevi.    No cervical or inguinal lymphadenopathy.  Hernial orifices intact.    Immunizations- Up to date         Relevant Orders    Comp Metabolic Panel (14)    Lipid Panel    TSH W Reflex To Free T4    CBC, NO DIFFERENTIAL/PLATELET    Hemoglobin A1C [E]    Fibroadenoma of right breast     Upcoming surgical excision of right breast fibroma         RUQ pain - Primary     Notices RUQ pain  when walking.  Father had a stomach ulcer.    Plan  Ultrasound of her abdomen  Check for H Pylori  Annual labs         Relevant Orders    US ABDOMEN COMPLETE (CPT=76700)    Comp Metabolic Panel (14)    Lipid Panel    TSH W Reflex To Free T4    CBC, NO DIFFERENTIAL/PLATELET     Other Visit Diagnoses       Pain of upper abdomen        Relevant Orders    H. Pylori Stool Ag, EIA [E]    Allergy, initial encounter        Relevant Orders    Adult Food Allergy Prof [E]               Orders Placed This Encounter   Procedures    Comp Metabolic Panel (14)    Lipid Panel    TSH W Reflex To Free T4    CBC, NO DIFFERENTIAL/PLATELET    H. Pylori Stool Ag, EIA [E]    Hemoglobin A1C [E]    Adult Food Allergy Prof [E]       Meds This Visit:  Requested Prescriptions      No prescriptions requested or ordered in this encounter       Imaging & Referrals:  US ABDOMEN COMPLETE (SHR=[id number])         CITLALI Quiroz

## 2024-03-19 NOTE — ASSESSMENT & PLAN NOTE
Notices RUQ pain when walking.  Father had a stomach ulcer.    Plan  Ultrasound of her abdomen  Check for H Pylori  Annual labs

## 2024-03-20 ENCOUNTER — LAB ENCOUNTER (OUTPATIENT)
Dept: LAB | Facility: HOSPITAL | Age: 24
End: 2024-03-20
Attending: NURSE PRACTITIONER
Payer: COMMERCIAL

## 2024-03-20 ENCOUNTER — HOSPITAL ENCOUNTER (OUTPATIENT)
Dept: ULTRASOUND IMAGING | Age: 24
Discharge: HOME | End: 2024-03-20
Attending: NURSE PRACTITIONER
Payer: COMMERCIAL

## 2024-03-20 DIAGNOSIS — R10.11 RUQ PAIN: ICD-10-CM

## 2024-03-20 DIAGNOSIS — Z00.00 ANNUAL PHYSICAL EXAM: ICD-10-CM

## 2024-03-20 DIAGNOSIS — T78.40XA ALLERGY, INITIAL ENCOUNTER: ICD-10-CM

## 2024-03-20 LAB
ALBUMIN SERPL-MCNC: 4.5 G/DL (ref 3.2–4.8)
ALBUMIN/GLOB SERPL: 1.6 {RATIO} (ref 1–2)
ALP LIVER SERPL-CCNC: 76 U/L
ALT SERPL-CCNC: 7 U/L
ANION GAP SERPL CALC-SCNC: 5 MMOL/L (ref 0–18)
AST SERPL-CCNC: 12 U/L (ref ?–34)
BILIRUB SERPL-MCNC: 0.7 MG/DL (ref 0.3–1.2)
BUN BLD-MCNC: 9 MG/DL (ref 9–23)
BUN/CREAT SERPL: 11.1 (ref 10–20)
CALCIUM BLD-MCNC: 9.7 MG/DL (ref 8.7–10.4)
CHLORIDE SERPL-SCNC: 108 MMOL/L (ref 98–112)
CHOLEST SERPL-MCNC: 182 MG/DL (ref ?–200)
CO2 SERPL-SCNC: 25 MMOL/L (ref 21–32)
CREAT BLD-MCNC: 0.81 MG/DL
DEPRECATED RDW RBC AUTO: 39.8 FL (ref 35.1–46.3)
EGFRCR SERPLBLD CKD-EPI 2021: 104 ML/MIN/1.73M2 (ref 60–?)
ERYTHROCYTE [DISTWIDTH] IN BLOOD BY AUTOMATED COUNT: 12.7 % (ref 11–15)
EST. AVERAGE GLUCOSE BLD GHB EST-MCNC: 114 MG/DL (ref 68–126)
FASTING PATIENT LIPID ANSWER: YES
FASTING STATUS PATIENT QL REPORTED: YES
GLOBULIN PLAS-MCNC: 2.9 G/DL (ref 2.8–4.4)
GLUCOSE BLD-MCNC: 101 MG/DL (ref 70–99)
HBA1C MFR BLD: 5.6 % (ref ?–5.7)
HCT VFR BLD AUTO: 39.4 %
HDLC SERPL-MCNC: 70 MG/DL (ref 40–59)
HGB BLD-MCNC: 12.5 G/DL
LDLC SERPL CALC-MCNC: 87 MG/DL (ref ?–100)
MCH RBC QN AUTO: 27.3 PG (ref 26–34)
MCHC RBC AUTO-ENTMCNC: 31.7 G/DL (ref 31–37)
MCV RBC AUTO: 86 FL
NONHDLC SERPL-MCNC: 112 MG/DL (ref ?–130)
OSMOLALITY SERPL CALC.SUM OF ELEC: 285 MOSM/KG (ref 275–295)
PLATELET # BLD AUTO: 321 10(3)UL (ref 150–450)
POTASSIUM SERPL-SCNC: 4.1 MMOL/L (ref 3.5–5.1)
PROT SERPL-MCNC: 7.4 G/DL (ref 5.7–8.2)
RBC # BLD AUTO: 4.58 X10(6)UL
SODIUM SERPL-SCNC: 138 MMOL/L (ref 136–145)
TRIGL SERPL-MCNC: 146 MG/DL (ref 30–149)
TSI SER-ACNC: 1.99 MIU/ML (ref 0.55–4.78)
VLDLC SERPL CALC-MCNC: 23 MG/DL (ref 0–30)
WBC # BLD AUTO: 8.5 X10(3) UL (ref 4–11)

## 2024-03-20 PROCEDURE — 36415 COLL VENOUS BLD VENIPUNCTURE: CPT

## 2024-03-20 PROCEDURE — 85027 COMPLETE CBC AUTOMATED: CPT

## 2024-03-20 PROCEDURE — 82785 ASSAY OF IGE: CPT

## 2024-03-20 PROCEDURE — 76700 US EXAM ABDOM COMPLETE: CPT | Performed by: NURSE PRACTITIONER

## 2024-03-20 PROCEDURE — 80061 LIPID PANEL: CPT

## 2024-03-20 PROCEDURE — 83036 HEMOGLOBIN GLYCOSYLATED A1C: CPT

## 2024-03-20 PROCEDURE — 84443 ASSAY THYROID STIM HORMONE: CPT

## 2024-03-20 PROCEDURE — 80053 COMPREHEN METABOLIC PANEL: CPT

## 2024-03-20 PROCEDURE — 86003 ALLG SPEC IGE CRUDE XTRC EA: CPT

## 2024-03-21 ENCOUNTER — TELEPHONE (OUTPATIENT)
Dept: INTERNAL MEDICINE CLINIC | Facility: CLINIC | Age: 24
End: 2024-03-21

## 2024-03-21 DIAGNOSIS — K82.4 GALLBLADDER POLYP: Primary | ICD-10-CM

## 2024-03-21 LAB
ALLERGEN BRAZIL NUT: <0.1 KUA/L (ref ?–0.1)
ALMOND IGE QN: <0.1 KUA/L (ref ?–0.1)
CASHEW NUT IGE QN: <0.1 KUA/L (ref ?–0.1)
CLAM IGE QN: <0.1 KUA/L (ref ?–0.1)
CODFISH IGE QN: <0.1 KUA/L (ref ?–0.1)
CORN IGE QN: <0.1 KUA/L (ref ?–0.1)
COW MILK IGE QN: <0.1 KUA/L (ref ?–0.1)
EGG WHITE IGE QN: <0.1 KUA/L (ref ?–0.1)
HAZELNUT IGE QN: <0.1 KUA/L (ref ?–0.1)
IGE SERPL-ACNC: 80.6 KU/L (ref 2–214)
PEANUT IGE QN: <0.1 KUA/L (ref ?–0.1)
SALMON IGE QN: <0.1 KUA/L (ref ?–0.1)
SCALLOP IGE QN: <0.1 KUA/L (ref ?–0.1)
SESAME SEED IGE QN: <0.1 KUA/L (ref ?–0.1)
SHRIMP IGE QN: <0.1 KUA/L (ref ?–0.1)
SOYBEAN IGE QN: <0.1 KUA/L (ref ?–0.1)
WALNUT IGE QN: <0.1 KUA/L (ref ?–0.1)
WHEAT IGE QN: <0.1 KUA/L (ref ?–0.1)

## 2024-03-21 NOTE — TELEPHONE ENCOUNTER
Patient called to discuss U.S. of her abdomen.  No answer  Message left on voicemail    Micki Andersen DNP  Working with

## 2024-03-22 ENCOUNTER — PATIENT MESSAGE (OUTPATIENT)
Dept: INTERNAL MEDICINE CLINIC | Facility: CLINIC | Age: 24
End: 2024-03-22

## 2024-03-22 NOTE — TELEPHONE ENCOUNTER
From: Loida Benjamin  To: Micki Andersen  Sent: 3/22/2024 8:22 AM CDT  Subject: Left ear plugged up    Hi,     When I woke up this morning my left ear seems to be very plugged up. Is there anything I can take to help?

## 2024-03-28 ENCOUNTER — TELEPHONE (OUTPATIENT)
Dept: OBGYN CLINIC | Facility: CLINIC | Age: 24
End: 2024-03-28

## 2024-03-28 RX ORDER — DROSPIRENONE AND ETHINYL ESTRADIOL 0.03MG-3MG
1 KIT ORAL DAILY
Qty: 84 TABLET | Refills: 0 | Status: SHIPPED | OUTPATIENT
Start: 2024-03-28

## 2024-03-28 NOTE — TELEPHONE ENCOUNTER
Pt's last annual: 2/23/23 with [facility]  Pt;s next annual: 6/25/24 with [facility]  Pt's preferred pharmacy verified and refill of BC medication sent to cover pt until annual exam per protocol. Pt verbalized understanding.

## 2024-03-28 NOTE — TELEPHONE ENCOUNTER
Pt called for birth control refill until Annual on 6/25. [facility] in [place] (on file). Pt has 1 week left of medication. drospirenone-ethinyl estradiol 3-0.03 MG Oral Tab

## 2024-04-08 NOTE — PROGRESS NOTES
Problem: Occupational Therapy - Adult  Goal: By Discharge: Performs self-care activities at highest level of function for planned discharge setting.  See evaluation for individualized goals.  Description: FUNCTIONAL STATUS PRIOR TO ADMISSION:  Pt states his daughter comes over everyday to help him get dressed and another comes to help him sponge bathe at bed level. Requires assistance for all ADLs and transfers. Unclear of specifics due to pt being very [place].   Receives Help From: Family, ADL Assistance: Needs assistance, Bath: Maximum assistance, Dressing: Maximum assistance, Grooming: Moderate assistance, Feeding: Independent, Toileting: Needs assistance, Homemaking Assistance: Needs assistance, Ambulation Assistance: Needs assistance, Transfer Assistance: Needs assistance, Active : No     HOME SUPPORT: Patient lived with wife and received help from daughters.    Occupational Therapy Goals:  Initiated 4/8/2024  1.  Patient will perform grooming with Stand by Assist standing at sink within 7 day(s).  2.  Patient will perform upper body dressing with Stand by Assist within 7 day(s).  3.  Patient will perform lower body dressing with Stand by Assist within 7 day(s).  4.  Patient will perform toilet transfers with Contact Guard Assist  within 7 day(s).  5.  Patient will perform all aspects of toileting with Contact Guard Assist within 7 day(s).  6.  Patient will participate in upper extremity therapeutic exercise/activities with Minimal Assist for 5 minutes within 7 day(s).    7.  Patient will utilize energy conservation techniques during functional activities with verbal cues within 7 day(s).  4/8/2024 1153 by Kait Shin OT  Outcome: Not Progressing     Problem: Occupational Therapy - Adult  Goal: By Discharge: Performs self-care activities at highest level of function for planned discharge setting.  See evaluation for individualized goals.  Description: FUNCTIONAL STATUS PRIOR TO ADMISSION:  Pt states  Adrian Holguin is a 24year old female. Patient presents with:  Sore Throat: Pt is having sore throat that started 7 days ago with coughing and nasal congestion. Pt [de-identified] father stated that she was tested last week for strep and it was negative.       H bilateral ears and tympanic membrane normal, tonsils enlarged erythematous bilaterally, white exudate seen on the right tonsil. Posterior pharynx red and erythematous  NECK: supple, no adenopathy, no thyromegaly.   No palpable lymphadenopathy, tenderness i level or baseline comfort level  4/8/2024 1251 by Sangeeta Chew RN  Outcome: Progressing  4/8/2024 0601 by Daisy Sandoval RN  Outcome: Progressing  4/8/2024 0551 by Daisy Sandoval RN  Outcome: Progressing  Flowsheets (Taken 4/8/2024 0225)  Verbalizes/displays adequate comfort level or baseline comfort level: Assess pain using appropriate pain scale     Problem: Safety - Adult  Goal: Free from fall injury  4/8/2024 1251 by Sangeeta Chew RN  Outcome: Progressing  4/8/2024 0601 by Daisy Sandoval RN  Outcome: Progressing  4/8/2024 0551 by Daisy Sandoval RN  Outcome: Progressing     Problem: ABCDS Injury Assessment  Goal: Absence of physical injury  4/8/2024 1251 by Sangeeta Chew RN  Outcome: Progressing  4/8/2024 0601 by Daisy Sandoval RN  Outcome: Progressing  4/8/2024 0551 by Daisy Sandoval RN  Outcome: Progressing     Problem: Neurosensory - Adult  Goal: Achieves stable or improved neurological status  4/8/2024 1251 by Sangeeta Chew RN  Outcome: Progressing  4/8/2024 0601 by Daisy Sandoval RN  Outcome: Progressing  4/8/2024 0551 by Daisy Sandoval RN  Outcome: Progressing  Goal: Absence of seizures  4/8/2024 1251 by Sangeeta Chew RN  Outcome: Progressing  4/8/2024 0601 by Daisy Sandoval RN  Outcome: Progressing  4/8/2024 0551 by Daisy Sandoval RN  Outcome: Progressing  Goal: Remains free of injury related to seizures activity  4/8/2024 1251 by Sangeeta Chew RN  Outcome: Progressing  4/8/2024 0601 by Daisy Sandoval RN  Outcome: Progressing  4/8/2024 0551 by Daisy Sandoval RN  Outcome: Progressing  Goal: Achieves maximal functionality and self care  4/8/2024 1251 by Sangeeta Chew, RN  Outcome: Progressing  4/8/2024 0601 by Daisy Sandoval, ED  Outcome: Progressing  4/8/2024 0551 by Daisy Sandoval, ED  Outcome: Progressing     Problem: Musculoskeletal - Adult  Goal: Return mobility to

## 2024-04-25 RX ORDER — GARLIC EXTRACT 500 MG
1 CAPSULE ORAL DAILY
COMMUNITY

## 2024-04-25 NOTE — DISCHARGE INSTRUCTIONS
HOME INSTRUCTIONS  [facility] HOME CARE INSTRUCTIONS: POST-OP ANESTHESIA  The medication that you received for sedation or general anesthesia can last up to 24 hours. Your judgment and reflexes may be altered, even if you feel like your normal self.      We Recommend:   Do not drive any motor vehicle or bicycle   Avoid mowing the lawn, playing sports, or working with power tools/applicances (power saws, electric knives or mixers)   That you have someone stay with you on your first night home   Do not drink alcohol or take sleeping pills or tranquilizers   Do not sign legal documents within 24 hours of your procedure   If you had a nerve block for your surgery, take extra care not to put any pressure on your arm or hand for 24 hours    It is normal:  For you to have a sore throat if you had a breathing tube during surgery (while you were asleep!). The sore throat should get better within 48 hours. You can gargle with warm salt water (1/2 tsp in 4 oz warm water) or use a throat lozenge for comfort  To feel muscle aches or soreness especially in the abdomen, chest or neck. The achy feeling should go away in the next 24 hours  To feel weak, sleepy or \"wiped out\". Your should start feeling better in the next 24 hours.   To experience mild discomforts such as sore lip or tongue, headache, cramps, gas pains or a bloated feeling in your abdomen.   To experience mild back pain or soreness for a day or two if you had spinal or epidural anesthesia.   If you had laparoscopic surgery, to feel shoulder pain or discomfort on the day of surgery.   For some patients to have nausea after surgery/anesthesia    If you feel nausea or experience vomiting:   Try to move around less.   Eat less than usual or drink only liquids until the next morning   Nausea should resolve in about 24 hours    If you have a problem when you are at home:    Call your surgeons office   Discharge Instructions: After Your Surgery  You’ve just had surgery. During  surgery, you were given medicine called anesthesia to keep you relaxed and free of pain. After surgery, you may have some pain or nausea. This is common. Here are some tips for feeling better and getting well after surgery.   Going home  Your healthcare provider will show you how to take care of yourself when you go home. They'll also answer your questions. Have an adult family member or friend drive you home. For the first 24 hours after your surgery:   Don't drive or use heavy equipment.  Don't make important decisions or sign legal papers.  Take medicines as directed.  Don't drink alcohol.  Have someone stay with you, if needed. They can watch for problems and help keep you safe.  Be sure to go to all follow-up visits with your healthcare provider. And rest after your surgery for as long as your provider tells you to.   Coping with pain  If you have pain after surgery, pain medicine will help you feel better. Take it as directed, before pain becomes severe. Also, ask your healthcare provider or pharmacist about other ways to control pain. This might be with heat, ice, or relaxation. And follow any other instructions your surgeon or nurse gives you.      Stay on schedule with your medicine.     Tips for taking pain medicine  To get the best relief possible, remember these points:   Pain medicines can upset your stomach. Taking them with a little food may help.  Most pain relievers taken by mouth need at least 20 to 30 minutes to start to work.  Don't wait till your pain becomes severe before you take your medicine. Try to time your medicine so that you can take it before starting an activity. This might be before you get dressed, go for a walk, or sit down for dinner.  Constipation is a common side effect of some pain medicines. Call your healthcare provider before taking any medicines such as laxatives or stool softeners to help ease constipation. Also ask if you should skip any foods. Drinking lots of fluids and  eating foods such as fruits and vegetables that are high in fiber can also help. Remember, don't take laxatives unless your surgeon has prescribed them.  Drinking alcohol and taking pain medicine can cause dizziness and slow your breathing. It can even be deadly. Don't drink alcohol while taking pain medicine.  Pain medicine can make you react more slowly to things. Don't drive or run machinery while taking pain medicine.  Your healthcare provider may tell you to take acetaminophen to help ease your pain. Ask them how much you're supposed to take each day. Acetaminophen or other pain relievers may interact with your prescription medicines or other over-the-counter (OTC) medicines. Some prescription medicines have acetaminophen and other ingredients in them. Using both prescription and OTC acetaminophen for pain can cause you to accidentally overdose. Read the labels on your OTC medicines with care. This will help you to clearly know the list of ingredients, how much to take, and any warnings. It may also help you not take too much acetaminophen. If you have questions or don't understand the information, ask your pharmacist or healthcare provider to explain it to you before you take the OTC medicine.   Managing nausea  Some people have an upset stomach (nausea) after surgery. This is often because of anesthesia, pain, or pain medicine, less movement of food in the stomach, or the stress of surgery. These tips will help you handle nausea and eat healthy foods as you get better. If you were on a special food plan before surgery, ask your healthcare provider if you should follow it while you get better. Check with your provider on how your eating should progress. It may depend on the surgery you had. These general tips may help:   Don't push yourself to eat. Your body will tell you when to eat and how much.  Start off with clear liquids and soup. They're easier to digest.  Next try semi-solid foods as you feel ready.  These include mashed potatoes, applesauce, and gelatin.  Slowly move to solid foods. Don’t eat fatty, rich, or spicy foods at first.  Don't force yourself to have 3 large meals a day. Instead eat smaller amounts more often.  Take pain medicines with a small amount of solid food, such as crackers or toast. This helps prevent nausea.  When to call your healthcare provider  Call your healthcare provider right away if you have any of these:   You still have too much pain, or the pain gets worse, after taking the medicine. The medicine may not be strong enough. Or there may be a complication from the surgery.  You feel too sleepy, dizzy, or groggy. The medicine may be too strong.  Side effects such as nausea or vomiting. Your healthcare provider may advise taking other medicines to .  Skin changes such as rash, itching, or hives. This may mean you have an allergic reaction. Your provider may advise taking other medicines.  The incision looks different (for instance, part of it opens up).  Bleeding or fluid leaking from the incision site, and weren't told to expect that.  Fever of 100.4°F (38°C) or higher, or as directed by your provider.  Call 911  Call 911 right away if you have:   Trouble breathing  Facial swelling    If you have obstructive sleep apnea   You were given anesthesia medicine during surgery to keep you comfortable and free of pain. After surgery, you may have more apnea spells because of this medicine and other medicines you were given. The spells may last longer than normal.    At home:  Keep using the continuous positive airway pressure (CPAP) device when you sleep. Unless your healthcare provider tells you not to, use it when you sleep, day or night. CPAP is a common device used to treat obstructive sleep apnea.  Talk with your provider before taking any pain medicine, muscle relaxants, or sedatives. Your provider will tell you about the possible dangers of taking these medicines.  Contact your  provider if your sleeping changes a lot even when taking medicines as directed.  [employer] last reviewed this educational content on 10/1/2021  © 1889-9383 [employer]. All rights reserved. This information is not intended as a substitute for professional medical care. Always follow your healthcare professional's instructions.    Breast Surgery  Post-operative Instructions  Excisional Biopsy, Lumpectomy, Mastectomy, [place] Node Biopsy, or Axillary  Lymph Node Dissection  Laura Bolton MD  General Instructions  The following instructions will provide helpful information that will assist your recovery. These are designed to be general guidelines. Please remember that everyone heals and recovers differently. Listen to your body and rest when you are tired. If you have any questions or concerns, please do not hesitate to contact my office. I would like to see you in the office about one week after surgery, please schedule and appointment through my office to make a post-operative appointment if you do not already have one.     Restrictions  There are no lifting weight restrictions for the arm on the surgical side. You may gradually increase the amount of weight based on your comfort level. You should avoid a lot of repetitious activity with the arm until the drain is out (if one was placed) and the wound is well-healed (about two weeks).   You should not drive a car until you believe you can react to an emergency situation and you’re no longer taking narcotic pain medications.   You may shower the day after surgery. You should not bathe or swim (i.e. submerge wound) until the wound is well healed (about two weeks).  There are no dietary restrictions.    Exercise  You may begin arm exercises within a couple days. Do these 2 or 3 times per day, beginning with light exercise and gradually increase your range of motion and repetitions. This will help your arm regain full mobility. We will address your  activity level again at your post-operative visit.   You will have pain medication prescribed before discharge. Take this as directed to relieve pain. It is important that you be comfortable so that you may continue your stretching exercises.   If you find the medication prescribed is too strong, try Tylenol (Acetaminophen) or Ibuprofen.    Wound Care  You may remove the gauze dressing on the first or second postoperative day and then shower. You should leave the steri-strips in place; they will start to peel off about 10 days after your surgery. The stitches are all underneath the skin and will dissolve on their own. You will not need any stitches removed except if you have a drain in place.  I encourage you to shower once the outer bandage is removed, you may use soap and water directly over the steri-strips and pat dry following.  You should keep gauze dressing on the wound until the wound is completely dry and without drainage-usually 1-3 days.   If a surgical bra was placed after the surgery, I encourage you to wear it as much as possible during the week following the procedure (including during sleep). Alternatively, you may choose to wear your own bra provided it is comfortable, provides support and does not have an underwire. If the breast doesn’t move it is less painful.  If an elastic bandage was placed around your chest after the surgery you may remove it on the 1st or 2nd day after surgery. If you prefer to leave it on longer, you may.  It is normal to feel a lump in the area of the incisions for up to 6 months. This is part of the healing process. Eventually the breast will return to its normal condition.   Drain Care (if placed at time of axillary dissection or mastectomy)-This will be explained by your nurse prior to discharge.  Empty the drain and strip the tubing 2-3 times daily, more often if the plastic squeeze bottle fills up. This will prevent the tubing from clogging.   Starting at the top of  the tubing next to your body firmly grasp the tubing with the index finger and thumb of one hand. With the other hand use the index finger and thumb to move the fluid down the tubing (this is called “stripping the tubing”).   Uncap the pouring spout and squeeze the contents of the plastic bottle into the measuring cup.   Squeeze the bottle flat to create suction and replace the cap while squeezing to maintain the vacuum.   Measure and record the output and discard the fluid into the toilet. Record the output each time you empty the bottle.   Keep track of the output (drainage) and when the total is down to 30 cc’s or less over a 24-hour period we’ll remove the drain in the office. This is usually after 1-2  weeks, but can be longer in certain patients.   Drain removal takes about 30 seconds and is virtually painless. The suture is cut and the drain slides right out. You should call my office as the output approaches 30 cc’s over 24 hours so that we may schedule an office visit for drain removal.  If you have had reconstruction your plastic surgeon will remove the drain according to their protocol.    Pain Medication  You will be given a prescription for a narcotic pain medication (usually Norco) upon discharge. Many patients have very little pain and don’t want to use the narcotic. Don’t be afraid to use it if you’re uncomfortable. If you’d prefer you may substitute Tylenol or Ibuprofen (Motrin, Advil). Using an ice pack for a few minutes over the incision can also alleviate pain. If you do use the narcotic medication, use an over the counter stool softener or gentle laxative and stay well-hydrated as constipation is not uncommon with narcotics.    Pathology Report  The Pathology report is usually available 4-5 business days following the surgery. I will call you  with the results once the report is available.    Notify my office if:   Your temperature is over 101.5 F   You notice increasing swelling, redness,  warmth or drainage from around the incision or drain site.    If you experience any problems please call my office and either my nurse or myself will respond. After hours, you will be forwarded to my answering service which will help you get in touch with myself or the physician covering for me.

## 2024-04-29 ENCOUNTER — ANESTHESIA (OUTPATIENT)
Dept: SURGERY | Facility: HOSPITAL | Age: 24
End: 2024-04-29
Payer: COMMERCIAL

## 2024-04-29 ENCOUNTER — HOSPITAL ENCOUNTER (OUTPATIENT)
Facility: HOSPITAL | Age: 24
Setting detail: HOSPITAL OUTPATIENT SURGERY
Discharge: HOME | End: 2024-04-29
Attending: SURGERY | Admitting: SURGERY
Payer: COMMERCIAL

## 2024-04-29 ENCOUNTER — ANESTHESIA EVENT (OUTPATIENT)
Dept: SURGERY | Facility: HOSPITAL | Age: 24
End: 2024-04-29
Payer: COMMERCIAL

## 2024-04-29 VITALS
HEIGHT: 63 IN | SYSTOLIC BLOOD PRESSURE: 125 MMHG | BODY MASS INDEX: 34.02 KG/M2 | RESPIRATION RATE: 14 BRPM | TEMPERATURE: 97 F | DIASTOLIC BLOOD PRESSURE: 72 MMHG | OXYGEN SATURATION: 100 % | HEART RATE: 74 BPM | WEIGHT: 192 LBS

## 2024-04-29 DIAGNOSIS — D24.1 FIBROADENOMA OF RIGHT BREAST: ICD-10-CM

## 2024-04-29 LAB — B-HCG UR QL: NEGATIVE

## 2024-04-29 PROCEDURE — 88300 SURGICAL PATH GROSS: CPT | Performed by: SURGERY

## 2024-04-29 PROCEDURE — 81025 URINE PREGNANCY TEST: CPT

## 2024-04-29 PROCEDURE — 88307 TISSUE EXAM BY PATHOLOGIST: CPT | Performed by: SURGERY

## 2024-04-29 PROCEDURE — 0HBT0ZZ EXCISION OF RIGHT BREAST, OPEN APPROACH: ICD-10-PCS | Performed by: SURGERY

## 2024-04-29 RX ORDER — HYDROMORPHONE HYDROCHLORIDE 1 MG/ML
0.4 INJECTION, SOLUTION INTRAMUSCULAR; INTRAVENOUS; SUBCUTANEOUS EVERY 5 MIN PRN
Status: DISCONTINUED | OUTPATIENT
Start: 2024-04-29 | End: 2024-04-29

## 2024-04-29 RX ORDER — ONDANSETRON 2 MG/ML
INJECTION INTRAMUSCULAR; INTRAVENOUS AS NEEDED
Status: DISCONTINUED | OUTPATIENT
Start: 2024-04-29 | End: 2024-04-29 | Stop reason: SURG

## 2024-04-29 RX ORDER — METOCLOPRAMIDE 10 MG/1
10 TABLET ORAL ONCE
Status: COMPLETED | OUTPATIENT
Start: 2024-04-29 | End: 2024-04-29

## 2024-04-29 RX ORDER — SODIUM CHLORIDE, SODIUM LACTATE, POTASSIUM CHLORIDE, CALCIUM CHLORIDE 600; 310; 30; 20 MG/100ML; MG/100ML; MG/100ML; MG/100ML
INJECTION, SOLUTION INTRAVENOUS CONTINUOUS
Status: DISCONTINUED | OUTPATIENT
Start: 2024-04-29 | End: 2024-04-29

## 2024-04-29 RX ORDER — DEXAMETHASONE SODIUM PHOSPHATE 4 MG/ML
VIAL (ML) INJECTION AS NEEDED
Status: DISCONTINUED | OUTPATIENT
Start: 2024-04-29 | End: 2024-04-29 | Stop reason: SURG

## 2024-04-29 RX ORDER — ONDANSETRON 2 MG/ML
4 INJECTION INTRAMUSCULAR; INTRAVENOUS EVERY 6 HOURS PRN
Status: DISCONTINUED | OUTPATIENT
Start: 2024-04-29 | End: 2024-04-29

## 2024-04-29 RX ORDER — FAMOTIDINE 10 MG/ML
20 INJECTION, SOLUTION INTRAVENOUS ONCE
Status: COMPLETED | OUTPATIENT
Start: 2024-04-29 | End: 2024-04-29

## 2024-04-29 RX ORDER — LIDOCAINE HYDROCHLORIDE 10 MG/ML
INJECTION, SOLUTION EPIDURAL; INFILTRATION; INTRACAUDAL; PERINEURAL AS NEEDED
Status: DISCONTINUED | OUTPATIENT
Start: 2024-04-29 | End: 2024-04-29 | Stop reason: SURG

## 2024-04-29 RX ORDER — HYDROMORPHONE HYDROCHLORIDE 1 MG/ML
0.2 INJECTION, SOLUTION INTRAMUSCULAR; INTRAVENOUS; SUBCUTANEOUS EVERY 5 MIN PRN
Status: DISCONTINUED | OUTPATIENT
Start: 2024-04-29 | End: 2024-04-29

## 2024-04-29 RX ORDER — MORPHINE SULFATE 4 MG/ML
4 INJECTION, SOLUTION INTRAMUSCULAR; INTRAVENOUS EVERY 10 MIN PRN
Status: DISCONTINUED | OUTPATIENT
Start: 2024-04-29 | End: 2024-04-29

## 2024-04-29 RX ORDER — CEFAZOLIN SODIUM/WATER 2 G/20 ML
2 SYRINGE (ML) INTRAVENOUS ONCE
Status: COMPLETED | OUTPATIENT
Start: 2024-04-29 | End: 2024-04-29

## 2024-04-29 RX ORDER — HYDROCODONE BITARTRATE AND ACETAMINOPHEN 5; 325 MG/1; MG/1
1-2 TABLET ORAL EVERY 6 HOURS PRN
Qty: 20 TABLET | Refills: 0 | Status: SHIPPED | OUTPATIENT
Start: 2024-04-29

## 2024-04-29 RX ORDER — ACETAMINOPHEN 500 MG
1000 TABLET ORAL ONCE
Status: COMPLETED | OUTPATIENT
Start: 2024-04-29 | End: 2024-04-29

## 2024-04-29 RX ORDER — METOCLOPRAMIDE HYDROCHLORIDE 5 MG/ML
10 INJECTION INTRAMUSCULAR; INTRAVENOUS ONCE
Status: COMPLETED | OUTPATIENT
Start: 2024-04-29 | End: 2024-04-29

## 2024-04-29 RX ORDER — MIDAZOLAM HYDROCHLORIDE 1 MG/ML
INJECTION INTRAMUSCULAR; INTRAVENOUS AS NEEDED
Status: DISCONTINUED | OUTPATIENT
Start: 2024-04-29 | End: 2024-04-29 | Stop reason: SURG

## 2024-04-29 RX ORDER — MORPHINE SULFATE 4 MG/ML
2 INJECTION, SOLUTION INTRAMUSCULAR; INTRAVENOUS EVERY 10 MIN PRN
Status: DISCONTINUED | OUTPATIENT
Start: 2024-04-29 | End: 2024-04-29

## 2024-04-29 RX ORDER — LIDOCAINE HYDROCHLORIDE AND EPINEPHRINE 10; 10 MG/ML; UG/ML
INJECTION, SOLUTION INFILTRATION; PERINEURAL AS NEEDED
Status: DISCONTINUED | OUTPATIENT
Start: 2024-04-29 | End: 2024-04-29 | Stop reason: HOSPADM

## 2024-04-29 RX ORDER — NALOXONE HYDROCHLORIDE 0.4 MG/ML
80 INJECTION, SOLUTION INTRAMUSCULAR; INTRAVENOUS; SUBCUTANEOUS AS NEEDED
Status: DISCONTINUED | OUTPATIENT
Start: 2024-04-29 | End: 2024-04-29

## 2024-04-29 RX ORDER — BUPIVACAINE HYDROCHLORIDE 5 MG/ML
INJECTION, SOLUTION EPIDURAL; INTRACAUDAL AS NEEDED
Status: DISCONTINUED | OUTPATIENT
Start: 2024-04-29 | End: 2024-04-29 | Stop reason: HOSPADM

## 2024-04-29 RX ORDER — FAMOTIDINE 20 MG/1
20 TABLET, FILM COATED ORAL ONCE
Status: COMPLETED | OUTPATIENT
Start: 2024-04-29 | End: 2024-04-29

## 2024-04-29 RX ADMIN — MIDAZOLAM HYDROCHLORIDE 2 MG: 1 INJECTION INTRAMUSCULAR; INTRAVENOUS at 07:31:00

## 2024-04-29 RX ADMIN — SODIUM CHLORIDE, SODIUM LACTATE, POTASSIUM CHLORIDE, CALCIUM CHLORIDE: 600; 310; 30; 20 INJECTION, SOLUTION INTRAVENOUS at 08:07:00

## 2024-04-29 RX ADMIN — ONDANSETRON 4 MG: 2 INJECTION INTRAMUSCULAR; INTRAVENOUS at 07:51:00

## 2024-04-29 RX ADMIN — CEFAZOLIN SODIUM/WATER 2 G: 2 G/20 ML SYRINGE (ML) INTRAVENOUS at 07:36:00

## 2024-04-29 RX ADMIN — LIDOCAINE HYDROCHLORIDE 50 MG: 10 INJECTION, SOLUTION EPIDURAL; INFILTRATION; INTRACAUDAL; PERINEURAL at 07:33:00

## 2024-04-29 RX ADMIN — DEXAMETHASONE SODIUM PHOSPHATE 4 MG: 4 MG/ML VIAL (ML) INJECTION at 07:50:00

## 2024-04-29 RX ADMIN — SODIUM CHLORIDE, SODIUM LACTATE, POTASSIUM CHLORIDE, CALCIUM CHLORIDE: 600; 310; 30; 20 INJECTION, SOLUTION INTRAVENOUS at 07:31:00

## 2024-04-29 NOTE — ANESTHESIA PREPROCEDURE EVALUATION
Anesthesia PreOp Note    HPI:     Loida Benjamin is a 24 year old female who presents for preoperative consultation requested by: Laura Bolton MD    Date of Surgery: 4/29/2024    Procedure(s):  Excision of right breast mass  Indication: Fibroadenoma of right breast [D24.1]    Relevant Problems   No relevant active problems       NPO:  Last Liquid Consumption Date: 04/28/24  Last Liquid Consumption Time: 2100  Last Solid Consumption Date: 04/28/24  Last Solid Consumption Time: 1900  Last Liquid Consumption Date: 04/28/24          History Review:  Patient Active Problem List    Diagnosis Date Noted    RUQ pain 03/19/2024    Fibroadenoma of right breast 02/21/2024    ADHD 03/11/2023    Anxiety 04/04/2022    Refractive amblyopia, left 04/06/2021    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    Subjective visual disturbance of both eyes 10/24/2019    High myopia, bilateral 10/24/2019    Regular astigmatism of both eyes 10/24/2019    F[place]ater, vitreous, left 10/24/2019    Vision abnormalities 10/02/2019    Irregular periods/menstrual cycles 10/02/2019    Annual physical exam 08/27/2019    Fibrocystic breast changes 11/21/2011    Migraines 09/07/2011       Past Medical History:    Feeding difficulties and mismanagement    OTHER DISEASES    blocled tear duct    UTI      Visual impairment    GLASSES AND CONTACTS       Past Surgical History:   Procedure Laterality Date    Other surgical history Right 2018    right breast biopsy  - benign       Medications Prior to Admission   Medication Sig Dispense Refill Last Dose    acidophilus-pectin Oral Cap Take 1 capsule by mouth daily.   4/28/2024 at 0900    drospirenone-ethinyl estradiol 3-0.03 MG Oral Tab Take 1 tablet by mouth daily. 84 tablet 0 4/28/2024 at 0900     Current Facility-Administered Medications Ordered in Epic   Medication Dose Route Frequency Provider Last Rate Last Admin    lactated ringers infusion   Intravenous Continuous Laura Bolton MD         ceFAZolin (Ancef) 2 g in 20mL IV syringe premix  2 g Intravenous Once Laura Bolton MD         No current [facility]-ordered outpatient medications on file.       No Known Allergies    Family History   Problem Relation Age of Onset    High Blood Pressure Maternal Grandmother     Diabetes Maternal Grandmother     Glaucoma Maternal Grandmother     High Blood Pressure Maternal Grandfather     Pancreatic Cancer Maternal Grandfather 81    Cancer Paternal Grandfather 79        Stomach    Breast Cancer Maternal Aunt     Diabetes Other         paternal great cousin    Cancer Other         mggm colon    Cancer Other         mgg aunts breast    Cancer Paternal Uncle 58        Larynx    Macular degeneration Neg      Social History     Socioeconomic History    Marital status: Single   Tobacco Use    Smoking status: Never    Smokeless tobacco: Never   Vaping Use    Vaping status: Never Used   Substance and Sexual Activity    Alcohol use: Not Currently    Drug use: Never    Sexual activity: Not Currently       Available pre-op labs reviewed.  Lab Results   Component Value Date    WBC 8.5 03/20/2024    RBC 4.58 03/20/2024    HGB 12.5 03/20/2024    HCT 39.4 03/20/2024    MCV 86.0 03/20/2024    MCH 27.3 03/20/2024    MCHC 31.7 03/20/2024    RDW 12.7 03/20/2024    .0 03/20/2024    URINEPREG Negative 04/29/2024     Lab Results   Component Value Date     03/20/2024    K 4.1 03/20/2024     03/20/2024    CO2 25.0 03/20/2024    BUN 9 03/20/2024    CREATSERUM 0.81 03/20/2024     (H) 03/20/2024    CA 9.7 03/20/2024          Vital Signs:  Body mass index is 34.01 kg/m².   height is 1.6 m (5' 3\") and weight is 87.1 kg (192 lb). Her oral temperature is 99 °F (37.2 °C). Her blood pressure is 146/86 and her pulse is 104. Her respiration is 16 and oxygen saturation is 97%.   Vitals:    04/25/24 1116 04/29/24 0627   BP:  146/86   Pulse:  104   Resp:  16   Temp:  99 °F (37.2 °C)   TempSrc:  Oral   SpO2:  97%   Weight: 86.2  kg (190 lb) 87.1 kg (192 lb)   Height: 1.6 m (5' 3\") 1.6 m (5' 3\")        Anesthesia Evaluation     Patient summary reviewed and Nursing notes reviewed    No history of anesthetic complications   Airway   Mallampati: II  TM distance: >3 FB  Neck ROM: full  Dental - Dentition appears grossly intact     Pulmonary - negative ROS and normal exam     ROS comment: Denies smoking or marijuana use   Cardiovascular - normal exam  Exercise tolerance: good    ROS comment: No chest pain or palpitations    Neuro/Psych    (+)  headaches, anxiety/panic attacks,  attention deficit hyperactivity disorder      GI/Hepatic/Renal - negative ROS   (-) GERD    Endo/Other - negative ROS   Abdominal   (+) obese                 Anesthesia Plan:   ASA:  2  Plan:   MAC  Post-op Pain Management: IV analgesics  Plan Comments: Urine pregnancy test negative today.   Possibility of GA discussed.  Informed Consent Plan and Risks Discussed With:  Patient  Discussed plan with:  Surgeon      I have informed Loida Loveurbonnais and/or legal guardian or family member of the nature of the anesthetic plan, benefits, risks including possible dental damage if relevant, major complications, and any alternative forms of anesthetic management.   All of the patient's questions were answered to the best of my ability. The patient desires the anesthetic management as planned.  Giselle Alexander MD  4/29/2024 7:09 AM  Present on Admission:  **None**

## 2024-04-29 NOTE — ANESTHESIA POSTPROCEDURE EVALUATION
Patient: Loida Benjamin    Procedure Summary       Date: 04/29/24 Room / Location: [facility] MAIN OR 08 / [facility] MAIN OR    Anesthesia Start: 0730 Anesthesia Stop: 0811    Procedure: Excision of right breast mass (Right: Breast) Diagnosis:       Fibroadenoma of right breast      (Fibroadenoma of right breast [D24.1])    Surgeons: Laura Bolton MD Anesthesiologist: Giselle Alexander MD    Anesthesia Type: MAC ASA Status: 2            Anesthesia Type: MAC    Vitals Value Taken Time   /60 04/29/24 0811   Temp 97.4 °F (36.3 °C) 04/29/24 0811   Pulse 80 04/29/24 0811   Resp 12 04/29/24 0811   SpO2 100 % 04/29/24 0811   Vitals shown include unfiled device data.    [facility] AN Post Evaluation:   Patient Evaluated in PACU  Patient Participation: complete - patient participated  Level of Consciousness: awake and alert  Pain Score: 0  Pain Management: adequate  Airway Patency:patent  Dental exam unchanged from preop  Yes    Nausea/Vomiting: none  Cardiovascular Status: acceptable and hemodynamically stable  Respiratory Status: acceptable, nonlabored ventilation, spontaneous ventilation and nasal cannula  Postoperative Hydration acceptable      Giselle Alexander MD  4/29/2024 8:11 AM

## 2024-04-29 NOTE — H&P
History of Present Illness:   Ms. Loida Benjamin is a 23 year old woman who presents with a self detected mass of the right breast.  The patient first noticed this about 6 years ago.  She had a biopsy done with marker placement back in 2018 that confirmed a fibroadenoma.  She says she has had increased size and symptomatology to this area recently.  She has no other personal prior history of breast disease or biopsies.  She does not have any known family history of breast cancer.  Recent right breast surveillance ultrasound on 2023 confirmed interval increase in size of the 6:00 subareolar mass up to 2.7 cm from 2.2 cm previously.  She denies any nipple discharge, skin changes or axillary symptoms.  She is here today for evaluation and recommendations for further therapy.        Past Medical History        Past Medical History:   Diagnosis Date    Feeding difficulties and mismanagement 99    OTHER DISEASES 99     blocled tear duct    UTI   01            Past Surgical History         Past Surgical History:   Procedure Laterality Date    OTHER SURGICAL HISTORY Right 2018     right breast biopsy  - benign            Gynecological History:  Pt is a   She achieved menarche at age 11 and LMP 23  She denies any history of hormone replacement therapy.  She has history of oral contraceptive use for 8 years, currently using.  She denies infertility treatment to achieve pregnancy.     Medications:    Medications Ordered Today   No outpatient medications have been marked as taking for the 24 encounter (Appointment) with Laura Bolton MD.            Allergies:    Allergies   No Known Allergies        Family History:   Family History         Family History   Problem Relation Age of Onset    High Blood Pressure Maternal Grandmother      Diabetes Maternal Grandmother      Glaucoma Maternal Grandmother      High Blood Pressure Maternal Grandfather      Pancreatic Cancer Maternal  Grandfather 81    Cancer Paternal Grandfather 79         Stomach    Breast Cancer Maternal Aunt      Diabetes Other           paternal great cousin    Cancer Other           mggm colon    Cancer Other           mgg aunts breast    Cancer Paternal Uncle 58         Larynx    Macular degeneration Neg              She is not of Ashkenazi Evangelical ancestry.     Social History:      History   Alcohol Use Not Currently             History   Smoking Status    Never   Smokeless Tobacco    Never      Ms. Loida Benjamin is Single with 0 children. She has 0 siblings. She is currently Unemployed/seeking employment        Review of Systems:  General:   The patient denies, fever, chills, night sweats, fatigue, generalized weakness, change in appetite or weight loss.     HEENT:     The patient denies eye irritation, cataracts, redness, glaucoma, yellowing of the eyes, change in vision or color blindness. The patient denies hearing loss, ringing in the ears, ear drainage, earaches, nasal congestion, nose bleeds, snoring, pain in mouth/throat, hoarseness, change in voice, facial trauma. +glasses/contacts     Respiratory:  The patient denies chronic cough, phlegm, hemoptysis, pleurisy/chest pain, pneumonia, asthma, wheezing, difficulty in breathing with exertion, emphysema, chronic bronchitis, shortness of breath or abnormal sound when breathing.      Cardiovascular:  There is no history of chest pain, chest pressure/discomfort, palpitations, irregular heartbeat, fainting or near-fainting, difficulty breathing when lying flat, SOB/Coughing at night, swelling of the legs or chest pain while walking.     Breasts:  See history of present illness     Gastrointestinal:     There is no history of difficulty or pain with swallowing, reflux symptoms, vomiting, dark or bloody stools, constipation, yellowing of the skin, indigestion, nausea, change in bowel habits, diarrhea, abdominal pain or vomiting blood.      Genitourinary:  The patient  denies frequent urination, needing to get up at night to urinate, urinary hesitancy or retaining urine, painful urination, urinary incontinence, decreased urine stream, blood in the urine or vaginal/penile discharge.     Skin:    The patient denies rash, itching, skin lesions, dry skin, change in skin color or change in moles.      Hematologic/Lymphatic:  The patient denies easily bruising or bleeding or persistent swollen glands or lymph nodes.      Musculoskeletal:  The patient denies muscle aches/pain, joint pain, stiff joints, neck pain, back pain or bone pain.     Neuropsychiatric:  There is no history of migraines or severe headaches, seizure/epilepsy, speech problems, coordination problems, trembling/tremors, fainting/black outs, dizziness, memory problems, loss of sensation/numbness, problems walking, weakness, tingling or burning in hands/feet. There is no history of abusive relationship, bipolar disorder, sleep disturbance, +anxiety, depression or feeling of despair.     Endocrine:    There is no history of poor/slow wound healing, weight loss/gain, fertility or hormone problems, cold intolerance, thyroid disease.      Allergic/Immunologic:  There is no history of hives, hay fever, angioedema or anaphylaxis.     /82 (BP Location: Left arm, Patient Position: Sitting)   Pulse 73   Temp 98.1 °F (36.7 °C) (Temporal)   Resp 17   Ht 1.6 m (5' 3\")   Wt 64 kg (141 lb)   LMP 10/08/2023 (Exact Date)   SpO2 99%   BMI 24.98 kg/m²      Physical Exam:  The patient is an alert, oriented, well-nourished and  well-developed woman who appears her stated age. Her speech patterns and movements are normal. Her affect is appropriate.     HEENT: The head is normocephalic. The neck is supple. The thyroid is not enlarged and is without palpable masses/nodules. There are no palpable masses. The trachea is in the midline. Conjunctiva are clear, non-icteric.     Chest: The chest expands symmetrically. The lungs are  clear to auscultation.     Heart: The rhythm is regular.  There are no murmurs, rubs, gallops or thrills.     Breasts:  Her breasts are symmetrical with a cup size 36DD.  Right breast: The skin, nipple ,and areola appear normal. There is no skin dimpling with movement of the pectoralis. There is no nipple retraction. No nipple discharge can be elicited. The parenchyma is mildly nodular. There is a palpable smooth and mobile encapsulated mass measuring proxy 2 x 2 cm in the immediate 6:00 subareolar region.  The axillary tail is normal.  Left breast:   The skin, nipple, and areola appear normal. There is no skin dimpling with movement of the pectoralis. There is no nipple retraction. No nipple discharge can be elicited. The parenchyma is mildly nodular. There are no dominant masses in the breast. The axillary tail is normal.     Abdomen:  The abdomen is soft, flat and non tender. The liver is not enlarged. There are no palpable masses.     Lymph Nodes:  The supraclavicular, axillary and cervical regions are free of significant lymphadenopathy.     Back: There is no vertebral column tenderness.     Skin: The skin appears normal. There are no suspicious appearing rashes or lesions.     Extremities: The extremities are without deformity, cyanosis or edema.     Impression:   Ms. Loida Benjamin is a 23 year old woman presents with an enlarging, biopsy-proven right breast fibroadenoma.     Discussion and Plan:  I had a discussion with the Patient regarding her breast exam. On exam today I found palpable mass corresponding the biopsy confirmed fibroadenoma in the right breast with no other clinical findings bilaterally.  I personally reviewed the recent imaging and prior pathology we discussed this at length.     We did discuss that fibroadenomas are not thought to be direct precursor lesions nor are they considered to be markers of a higher risk for developing breast cancer in the future. However, fibroadenomas can  grown with time and become symptomatic which can prompt surgical excision. Growth of the lesion over time warrants excision to distinguish the lesion from a proliferative Phyllodes tumor. In light of the fact that this is increased in size and symptomatology I recommend excision of the right breast mass.   The risks and possible complications of the procedure were explained to the patient and her family and she understood and agreed to the proposed plan. She was given ample opportunity for questions and those questions were answered to her satisfaction. She has been  encouraged to contact the office with any questions or concerns prior to her next appointment.     Pre-op Diagnosis: Fibroadenoma of right breast [D24.1]    The above referenced H&P was reviewed by Laura Bolton MD on 4/29/2024, the patient was examined and no significant changes have occurred in the patient's condition since the H&P was performed.  I discussed with the patient and/or legal representative the potential benefits, risks and side effects of this procedure; the likelihood of the patient achieving goals; and potential problems that might occur during recuperation.  I discussed reasonable alternatives to the procedure, including risks, benefits and side effects related to the alternatives and risks related to not receiving this procedure.  We will proceed with procedure as planned.

## 2024-04-29 NOTE — OPERATIVE REPORT
[facility]    PATIENT'S NAME: DOROTA SMITH   ATTENDING PHYSICIAN: Laura Bolton MD   OPERATING PHYSICIAN: Laura Bolton MD   PATIENT ACCOUNT#:   [id number]    LOCATION:  [address]  MEDICAL RECORD #:   [id number]       YOB: 2000  ADMISSION DATE:       04/29/2024      OPERATION DATE:  04/29/2024    OPERATIVE REPORT    PREOPERATIVE DIAGNOSIS:  Right breast fibroadenoma.  POSTOPERATIVE DIAGNOSIS:  Right breast fibroadenoma.  PROCEDURE:  Right breast excisional biopsy.    ASSISTANT:  Alana Overton CSA.    ANESTHESIA:  Monitored anesthesia care and local.    ESTIMATED BLOOD LOSS:  5 mL.    DRAINS:  None.    COMPLICATIONS:  None.    DISPOSITION:  Stable on transfer to the recovery room.    INDICATIONS:  The patient is a 24-year-old female.  She has a self-detected mass of the right breast.  She has had interval increase in size of this mass as well as symptomatology.  She had had a biopsy back in 2018 that confirmed this to be a benign fibroadenoma.  Given increase in size and symptomatology, surgical excision was recommended.  Risks and possible complications were discussed with the patient including, but not limited to, infection, bleeding, injury to surrounding structures, possible need for reoperation, and she agreed to the proposed surgery.    OPERATIVE TECHNIQUE:  The patient was brought to the OR, placed in supine position, properly padded and secured, given a dose of IV antibiotics.  Sequential compression devices were applied to legs for DVT prophylaxis.  Monitored anesthesia care was induced.  The right breast was prepped and draped in the usual sterile fashion.  Lidocaine 1% with epinephrine was used to infiltrate the skin and subcutaneous tissues at the targeted incision site.  A curvilinear incision was made along the inferior areolar border with a 15-blade knife in the skin.  Mass was identified, brought in the field and excised, sent for routine  permanent pathologic evaluation labeled as right breast mass 6 o'clock.  Wound was irrigated, hemostasis assured with electrocautery.  Closure was accomplished with interrupted 3-0 Vicryl for deep layer and running 4-0 subcuticular Monocryl for skin.  Mastisol and Steri-Strips were applied.  Marcaine 0.5% was instilled in the cavity to assist with postoperative analgesia.  A sterile dressing and compression bra were placed.  Blood loss was minimal.  All counts were correct at the conclusion of the procedure.  She tolerated the procedure well.  She was transferred to the recovery room in stable condition.    Dictated By Laura Bolton MD  d: 04/29/2024 07:54:27  t: 04/29/2024 10:11:16  [facility] [id number]  [facility] [place]    cc: MD Sonia Funk MD Diana Anton, APRN

## 2024-04-29 NOTE — BRIEF OP NOTE
Pre-Operative Diagnosis: Fibroadenoma of right breast [D24.1]     Post-Operative Diagnosis: Fibroadenoma of right breast [D24.1]      Procedure Performed:   Excision of right breast mass    Surgeons and Role:     * Laura Bolton MD - Primary    Assistant(s):  Surgical Assistant.: Alana Overton     Surgical Findings: 6:00 subareolar breast mass     Specimen: Right breast mass 6:00     Estimated Blood Loss: 5cc    Laura Bolton MD  4/29/2024  7:54 AM

## 2024-05-03 ENCOUNTER — TELEPHONE (OUTPATIENT)
Dept: SURGERY | Facility: CLINIC | Age: 24
End: 2024-05-03

## 2024-05-03 NOTE — TELEPHONE ENCOUNTER
Patient scheduled for postop appointment on 5/8/2024.  Requested to be rescheduled to a weekend or after 5:30 PM.  Explained to patient that we do not have those appointments available.  Patient stated she will follow-up with her PCP for postop.

## 2024-06-14 RX ORDER — DROSPIRENONE AND ETHINYL ESTRADIOL 0.03MG-3MG
1 KIT ORAL DAILY
Qty: 84 TABLET | Refills: 0 | Status: SHIPPED | OUTPATIENT
Start: 2024-06-14

## 2024-06-14 NOTE — TELEPHONE ENCOUNTER
Requested Prescriptions     Pending Prescriptions Disp Refills    drospirenone-ethinyl estradiol 3-0.03 MG Oral Tab 84 tablet 0     Sig: Take 1 tablet by mouth daily.     Last annual 2/23/23  Last filled 3/28/24 x 84 tabs  Pap due at annual     Next annual 10/7/24.  To Dr. Shadi santizo to refill to upcoming annual?

## 2024-07-29 ENCOUNTER — HOSPITAL ENCOUNTER (EMERGENCY)
Facility: HOSPITAL | Age: 24
Discharge: HOME | End: 2024-07-29
Attending: EMERGENCY MEDICINE
Payer: COMMERCIAL

## 2024-07-29 ENCOUNTER — APPOINTMENT (OUTPATIENT)
Dept: GENERAL RADIOLOGY | Facility: HOSPITAL | Age: 24
End: 2024-07-29
Payer: COMMERCIAL

## 2024-07-29 VITALS
HEIGHT: 63 IN | HEART RATE: 94 BPM | SYSTOLIC BLOOD PRESSURE: 136 MMHG | DIASTOLIC BLOOD PRESSURE: 98 MMHG | RESPIRATION RATE: 18 BRPM | OXYGEN SATURATION: 100 % | BODY MASS INDEX: 33.66 KG/M2 | TEMPERATURE: 99 F | WEIGHT: 190 LBS
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
## 2024-07-29 DIAGNOSIS — S61.051A DOG BITE OF RIGHT THUMB, INITIAL ENCOUNTER: Primary | ICD-10-CM

## 2024-07-29 DIAGNOSIS — S61.309A FINGERNAIL AVULSION, PARTIAL, INITIAL ENCOUNTER: ICD-10-CM

## 2024-07-29 PROCEDURE — 73140 X-RAY EXAM OF FINGER(S): CPT

## 2024-07-29 PROCEDURE — 99283 EMERGENCY DEPT VISIT LOW MDM: CPT

## 2024-07-30 NOTE — ED INITIAL ASSESSMENT (HPI)
Pt presents to ed with c/o dog bite and finger injury. Pt states her dog bit her right thumb and she thinks her nail may have come off.  Laceration to the right thumb noted.    Pt dog is UTD with vaccinations.

## 2024-07-30 NOTE — TELEPHONE ENCOUNTER
Pt mother Annika [place]) called with a in network neuro ophthalmologist  Pt is scheduled for 7.14.21 with Julian Stanley. Please sign referral so pt can move forward with appointment. 3

## 2024-07-30 NOTE — DISCHARGE INSTRUCTIONS
Keep wound clean by washing twice daily with soapy water.  Apply antibiotic ointment such as Neosporin or triple antibiotic ointment 3 times daily.

## 2024-07-30 NOTE — ED PROVIDER NOTES
Patient Seen in: [facility] Emergency Department    History     Chief Complaint   Patient presents with    Bite       HPI    Patient presents to the ED after being bitten on her right thumb by her dog earlier today.  She states that the nail was injured.  Denies other complaints.  Dog up-to-date with immunizations.    History reviewed.   Past Medical History:    Feeding difficulties and mismanagement    OTHER DISEASES    blocled tear duct    UTI      Visual impairment    GLASSES AND CONTACTS       History reviewed.   Past Surgical History:   Procedure Laterality Date    Other surgical history Right 2018    right breast biopsy  - benign         Medications :  (Not in a hospital admission)       Family History   Problem Relation Age of Onset    High Blood Pressure Maternal Grandmother     Diabetes Maternal Grandmother     Glaucoma Maternal Grandmother     High Blood Pressure Maternal Grandfather     Pancreatic Cancer Maternal Grandfather 81    Cancer Paternal Grandfather 79        Stomach    Breast Cancer Maternal Aunt     Diabetes Other         paternal great cousin    Cancer Other         mggm colon    Cancer Other         mgg aunts breast    Cancer Paternal Uncle 58        Larynx    Macular degeneration Neg        Smoking Status:   Social History     Socioeconomic History    Marital status: Single   Tobacco Use    Smoking status: Never    Smokeless tobacco: Never   Vaping Use    Vaping status: Never Used   Substance and Sexual Activity    Alcohol use: Not Currently    Drug use: Never    Sexual activity: Not Currently       Constitutional and vital signs reviewed.      Social History and Family History elements reviewed from today, pertinent positives to the presenting problem noted.    Physical Exam     ED Triage Vitals [07/29/24 1951]   BP (!) 136/98   Pulse 94   Resp 18   Temp 98.7 °F (37.1 °C)   Temp src Temporal   SpO2 100 %   O2 Device None (Room air)       All measures to prevent infection  transmission during my interaction with the patient were taken. Handwashing was performed prior to and after the exam.  Stethoscope and any equipment used during my examination was cleaned with super sani-cloth germicidal wipes following the exam.     Physical Exam  Constitutional:       Appearance: Normal appearance.   Pulmonary:      Effort: Pulmonary effort is normal. No respiratory distress.   Musculoskeletal:         General: Signs of injury present.      Comments: Avulsion of the distal half of the right thumbnail.  There is a small abrasion to the cuticle of the right thumb as well however no other skin injury.   Skin:     General: Skin is warm and dry.   Neurological:      Mental Status: She is alert. Mental status is at baseline.   Psychiatric:         Mood and Affect: Mood normal.         Behavior: Behavior normal.         ED Course      Labs Reviewed - No data to display    As Interpreted by me    Imaging Results Available and Reviewed while in ED: XR FINGER(S) (MIN 2 VIEWS), RIGHT THUMB (CPT=73140)    Result Date: 7/29/2024  CONCLUSION: Bandage artifact surrounds the right thumb and there is soft tissue inflammation compatible with soft tissue injury.  No acute osseous abnormality of the right thumb.   Dictated by (CST): Brian Robertson MD on 7/29/2024 at 8:19 PM     Finalized by (CST): Brian Robertson MD on 7/29/2024 at 8:21 PM         ED Medications Administered: Medications - No data to display      MDM     Vitals:    07/29/24 1951   BP: (!) 136/98   Pulse: 94   Resp: 18   Temp: 98.7 °F (37.1 °C)   TempSrc: Temporal   SpO2: 100%   Weight: 86.2 kg   Height: 160 cm (5' 3\")     *I personally reviewed and interpreted all ED vitals.    Pulse Ox: 100%, Room air, Normal     Differential Diagnosis/ Diagnostic Considerations: Thumb nail avulsion, dog bite, other    Complicating Factors: The patient already has does not have any pertinent problems on file. to contribute to the complexity of this ED  evaluation.    Medical Decision Making  The patient presents to the ED with a right thumb injury after being bitten by her dog.  Distal nail avulsion and small skin injury to the cuticle however no other skin laceration or proximal nail injury.  X-ray without concerning findings.  Stable for discharge with supportive care, I do not feel need for antibiotics given superficial injury.    Problems Addressed:  Dog bite of right thumb, initial encounter: acute illness or injury  Fingernail avulsion, partial, initial encounter: acute illness or injury    Amount and/or Complexity of Data Reviewed  Radiology: ordered and independent interpretation performed. Decision-making details documented in ED Course.     Details: I personally reviewed the patient's right thumb x-ray images and noted no fracture        Condition upon leaving the department: Stable    Disposition and Plan     Clinical Impression:  1. Dog bite of right thumb, initial encounter    2. Fingernail avulsion, partial, initial encounter        Disposition:  Discharge    Follow-up:  Tamara Weber MD  [address]  [place] [address]  [phone]    Schedule an appointment as soon as possible for a visit  As needed      Medications Prescribed:  Discharge Medication List as of 7/29/2024  8:54 PM

## 2024-08-30 RX ORDER — DROSPIRENONE AND ETHINYL ESTRADIOL 0.03MG-3MG
1 KIT ORAL DAILY
Qty: 28 TABLET | Refills: 0 | Status: SHIPPED | OUTPATIENT
Start: 2024-08-30

## 2024-08-30 NOTE — TELEPHONE ENCOUNTER
Requested Prescriptions     Pending Prescriptions Disp Refills    DROSPIRENONE-ETHINYL ESTRADIOL 3-0.03 MG Oral Tab [Pharmacy Med Name: DROSPIRENONE-EE 3-0.03 MG TAB] 84 tablet 0     Sig: TAKE 1 TABLET BY MOUTH EVERY DAY     Last annual 2/23/23  Pap due at annual    Last filled 6/14/24 x 84 tab. Per notes, Dr. Drake refilled to last to annual. Patient still needs 1 pack to get her to Oct appointment.   Next annual 10/7/24. Filled x 1.

## 2024-09-09 ENCOUNTER — OFFICE VISIT (OUTPATIENT)
Dept: INTERNAL MEDICINE CLINIC | Facility: CLINIC | Age: 24
End: 2024-09-09

## 2024-09-09 VITALS
WEIGHT: 198 LBS | SYSTOLIC BLOOD PRESSURE: 140 MMHG | HEART RATE: 95 BPM | DIASTOLIC BLOOD PRESSURE: 95 MMHG | BODY MASS INDEX: 35.08 KG/M2 | HEIGHT: 63 IN

## 2024-09-09 DIAGNOSIS — M79.605 PAIN IN BOTH LOWER EXTREMITIES: ICD-10-CM

## 2024-09-09 DIAGNOSIS — M79.604 PAIN IN BOTH LOWER EXTREMITIES: ICD-10-CM

## 2024-09-09 DIAGNOSIS — H93.8X1 MASS OF RIGHT EAR: ICD-10-CM

## 2024-09-09 DIAGNOSIS — G43.719 INTRACTABLE CHRONIC MIGRAINE WITHOUT AURA AND WITHOUT STATUS MIGRAINOSUS: Primary | ICD-10-CM

## 2024-09-09 DIAGNOSIS — R10.11 RUQ PAIN: ICD-10-CM

## 2024-09-09 PROCEDURE — 3080F DIAST BP >= 90 MM HG: CPT | Performed by: NURSE PRACTITIONER

## 2024-09-09 PROCEDURE — 3008F BODY MASS INDEX DOCD: CPT | Performed by: NURSE PRACTITIONER

## 2024-09-09 PROCEDURE — 99214 OFFICE O/P EST MOD 30 MIN: CPT | Performed by: NURSE PRACTITIONER

## 2024-09-09 PROCEDURE — 3077F SYST BP >= 140 MM HG: CPT | Performed by: NURSE PRACTITIONER

## 2024-09-09 NOTE — ASSESSMENT & PLAN NOTE
Slight protrusion behind right ear- barely palpable. No pain.    Plan    Zyrtec 10 mg at night    Heating pad to right ear    If it does not go away follow up for an Ultrasound       Pt with 10 seconds of seizure like activity

## 2024-09-09 NOTE — PROGRESS NOTES
HPI:    Patient ID: Loida Benjamin is a 24 year old female.  Chief Complaint     Ear Problem (Pt c/o bump behind right ear)  Headache  HPI  24 year old I last saw in March.for RUQ pain  U,S Showing  mpression:     CONCLUSION:       Small cholesterol polyp of the gallbladder.     I instructed her to follow up with surgeon. She told me at her appointment today she forgot.    She saw breast surgeon for an increasing in size fibroadenoma.    Headache  She used to get headaches before her menstrual cycle but now they are mor frequent over her right eye and she states her right forehead is asymmetric.    Today she presents with a lump behind her right ear. There is no pain. She just felt it in the shower.    Pain behind her knees. She was on the trampoline and developed the pain but she stills has the pain.It has been a month since she used the trampoline.    BMI 35    Immunization History   Administered Date(s) Administered    DTAP 06/15/1999, 08/09/1999, 10/11/1999, 07/10/2000, 04/25/2003    HEP A 09/07/2011    HEP A,Ped/Adol,(2 Dose) 08/11/2016    HEP B 04/10/1999, 05/11/1999    HEP B/HIB 04/10/2000    HIB 06/15/1999, 08/09/1999, 10/11/1999    HPV (Gardasil) 07/25/2011, 09/07/2011    Hpv Virus Vaccine 9 Ellen Im 01/02/2016    IPV 06/15/1999, 08/09/1999, 07/10/2000, 04/25/2003    Influenza 08/11/2010, 09/07/2011    Influenza A (H1N1) 11/10/2009    Influenza Vaccine, trivalent (IIV3), 0.5mL IM 09/12/2009    MMR 04/10/2000, 04/30/2004    Meningococcal-Menactra 07/02/2010, 08/11/2016    Pneumococcal (Prevnar 7) 07/10/2000, 10/09/2000    Rotavirus 3 Dose 06/15/1999    TDAP 07/02/2010, 07/17/2020    Varicella 09/03/2002, 07/02/2010       Past Medical History:    Feeding difficulties and mismanagement    OTHER DISEASES    blocled tear duct    UTI      Visual impairment    GLASSES AND CONTACTS      Past Surgical History:   Procedure Laterality Date    Other surgical history Right 2018    right breast biopsy  - benign       Social History     Socioeconomic History    Marital status: Single   Tobacco Use    Smoking status: Never    Smokeless tobacco: Never   Vaping Use    Vaping status: Never Used   Substance and Sexual Activity    Alcohol use: Not Currently    Drug use: Never    Sexual activity: Not Currently          Review of Systems   Constitutional:  Negative for chills, fatigue and fever.   HENT:  Negative for ear pain, hearing loss, sinus pain, sore throat and trouble swallowing.    Eyes:  Negative for pain and visual disturbance.   Respiratory:  Negative for cough, chest tightness and shortness of breath.    Cardiovascular:  Negative for chest pain, palpitations and leg swelling.   Gastrointestinal:  Negative for abdominal pain, constipation, diarrhea, nausea and vomiting.   Endocrine: Negative for cold intolerance and heat intolerance.   Genitourinary:  Negative for dysuria and hematuria.   Musculoskeletal:  Negative for back pain and joint swelling.        Pain behind knees after jumping on the trampoline.   Skin:  Negative for rash.        Lump behind right ear.   Allergic/Immunologic: Negative for environmental allergies.   Neurological:  Positive for headaches. Negative for weakness and numbness.   Hematological:  Does not bruise/bleed easily.   Psychiatric/Behavioral:  Negative for dysphoric mood and sleep disturbance. The patient is not nervous/anxious.               Current Outpatient Medications   Medication Sig Dispense Refill    drospirenone-ethinyl estradiol 3-0.03 MG Oral Tab TAKE 1 TABLET BY MOUTH EVERY DAY 28 tablet 0    HYDROcodone-acetaminophen 5-325 MG Oral Tab Take 1-2 tablets by mouth every 6 (six) hours as needed for Pain. (Patient not taking: Reported on 9/9/2024) 20 tablet 0    acidophilus-pectin Oral Cap Take 1 capsule by mouth daily. (Patient not taking: Reported on 9/9/2024)       Allergies:No Known Allergies   PHYSICAL EXAM:   Physical Exam  Constitutional:       Appearance: Normal appearance. She  is well-developed.   HENT:      Head: Normocephalic.      Right Ear: Tympanic membrane normal.      Left Ear: Tympanic membrane normal.      Nose: Nose normal.      Mouth/Throat:      Mouth: Mucous membranes are moist.      Pharynx: No oropharyngeal exudate or posterior oropharyngeal erythema.   Eyes:      General:         Right eye: No discharge.         Left eye: No discharge.      Pupils: Pupils are equal, round, and reactive to light.   Cardiovascular:      Rate and Rhythm: Normal rate and regular rhythm.      Heart sounds: Normal heart sounds. No murmur heard.     No friction rub. No gallop.   Pulmonary:      Effort: Pulmonary effort is normal. No respiratory distress.      Breath sounds: Normal breath sounds. No wheezing, rhonchi or rales.   Abdominal:      General: Bowel sounds are normal. There is no distension.      Palpations: Abdomen is soft. There is no mass.      Tenderness: There is no abdominal tenderness. There is no right CVA tenderness, left CVA tenderness or guarding.   Musculoskeletal:         General: No tenderness.      Cervical back: Normal range of motion and neck supple. No tenderness.      Right lower leg: No edema.      Left lower leg: No edema.   Lymphadenopathy:      Cervical: No cervical adenopathy.   Skin:     General: Skin is warm and dry.      Findings: No rash.   Neurological:      Mental Status: She is alert and oriented to person, place, and time.      Coordination: Coordination normal.      Gait: Gait normal.   Psychiatric:         Mood and Affect: Mood normal.         Behavior: Behavior normal.         Thought Content: Thought content normal.         Judgment: Judgment normal.       BP (!) 140/95 (BP Location: Right arm, Patient Position: Sitting, Cuff Size: large)   Pulse 95   Ht 5' 3\" (1.6 m)   Wt 198 lb (89.8 kg)   LMP 08/15/2024 (Approximate)   BMI 35.07 kg/m²   Wt Readings from Last 2 Encounters:   09/09/24 198 lb (89.8 kg)   07/29/24 190 lb (86.2 kg)     Body mass  index is 35.07 kg/m².(2)  Lab Results   Component Value Date    WBC 8.5 03/20/2024    RBC 4.58 03/20/2024    HGB 12.5 03/20/2024    HCT 39.4 03/20/2024    MCV 86.0 03/20/2024    MCH 27.3 03/20/2024    MCHC 31.7 03/20/2024    RDW 12.7 03/20/2024    .0 03/20/2024    MPV 8.7 11/13/2018      Lab Results   Component Value Date     (H) 03/20/2024    BUN 9 03/20/2024    BUNCREA 11.1 03/20/2024    CREATSERUM 0.81 03/20/2024    ANIONGAP 5 03/20/2024    GFRNAA 99 08/29/2019    GFRAA 114 08/29/2019    CA 9.7 03/20/2024    OSMOCALC 285 03/20/2024    ALKPHO 76 03/20/2024    AST 12 03/20/2024    ALT 7 (L) 03/20/2024    BILT 0.7 03/20/2024    TP 7.4 03/20/2024    ALB 4.5 03/20/2024    GLOBULIN 2.9 03/20/2024     03/20/2024    K 4.1 03/20/2024     03/20/2024    CO2 25.0 03/20/2024      Lab Results   Component Value Date     03/20/2024    A1C 5.6 03/20/2024      Lab Results   Component Value Date    CHOLEST 182 03/20/2024    TRIG 146 03/20/2024    HDL 70 (H) 03/20/2024    LDL 87 03/20/2024    VLDL 23 03/20/2024    NONHDLC 112 03/20/2024      Lab Results   Component Value Date    TSH 1.991 03/20/2024                ASSESSMENT/PLAN:     Problem List Items Addressed This Visit       Mass of right ear     Slight protrusion behind right ear- barely palpable. No pain.    Plan    Zyrtec 10 mg at night    Heating pad to right ear    If it does not go away follow up for an Ultrasound             Migraines - Primary     Migraines - More frequent headache behind her right eye.    Allergy symptoms    Zyrtec 10 mg po nightly  Flonase 2 sprays daily- Point toward your ears when spraying         Pain in both lower extremities     Bilateral knee pain posterior after using the Trampoline    Plan  Monitor/Weight loss  Discussed lifestyle modifications including reductions in dietary total and saturated fat, weight loss, aerobic exercise, and eating a diet rich in fruits and vegetables.  Reduce bread, pasta and rice in  your diet.  Eliminate as much sugar from your diet as possible.          RUQ pain     RUQ pain- resolved    Small cholesterol polyp of the gallbladder.    Plan  Should follow up with surgeon               No orders of the defined types were placed in this encounter.      Meds This Visit:  Requested Prescriptions      No prescriptions requested or ordered in this encounter       Imaging & Referrals:  None         CITLALI Quiroz

## 2024-09-09 NOTE — ASSESSMENT & PLAN NOTE
RUQ pain- resolved    Small cholesterol polyp of the gallbladder.    Plan  Should follow up with surgeon

## 2024-09-09 NOTE — PATIENT INSTRUCTIONS
Zyrtec 10 mg at night    Heating pad to right ear    If it does not go away follow up for an Ultrasound    Follow up the polyp of the Gallbalder

## 2024-09-09 NOTE — ASSESSMENT & PLAN NOTE
Bilateral knee pain posterior after using the Trampoline    Plan  Monitor/Weight loss  Discussed lifestyle modifications including reductions in dietary total and saturated fat, weight loss, aerobic exercise, and eating a diet rich in fruits and vegetables.  Reduce bread, pasta and rice in your diet.  Eliminate as much sugar from your diet as possible.

## 2024-09-09 NOTE — ASSESSMENT & PLAN NOTE
Migraines - More frequent headache behind her right eye.    Allergy symptoms    Zyrtec 10 mg po nightly  Flonase 2 sprays daily- Point toward your ears when spraying

## 2024-09-24 RX ORDER — DROSPIRENONE AND ETHINYL ESTRADIOL 0.03MG-3MG
1 KIT ORAL DAILY
Qty: 28 TABLET | Refills: 0 | OUTPATIENT
Start: 2024-09-24

## 2024-09-26 DIAGNOSIS — Z76.0 MEDICATION REFILL: Primary | ICD-10-CM

## 2024-09-26 RX ORDER — DROSPIRENONE AND ETHINYL ESTRADIOL 0.03MG-3MG
1 KIT ORAL DAILY
Qty: 84 TABLET | Refills: 0 | Status: SHIPPED | OUTPATIENT
Start: 2024-09-26 | End: 2024-10-07

## 2024-10-07 ENCOUNTER — OFFICE VISIT (OUTPATIENT)
Dept: OBGYN CLINIC | Facility: CLINIC | Age: 24
End: 2024-10-07

## 2024-10-07 VITALS
HEIGHT: 61.7 IN | WEIGHT: 200 LBS | HEART RATE: 102 BPM | DIASTOLIC BLOOD PRESSURE: 88 MMHG | SYSTOLIC BLOOD PRESSURE: 124 MMHG | BODY MASS INDEX: 36.8 KG/M2

## 2024-10-07 DIAGNOSIS — N92.3 INTERMENSTRUAL BLEEDING: ICD-10-CM

## 2024-10-07 DIAGNOSIS — Z30.41 ORAL CONTRACEPTIVE PILL SURVEILLANCE: ICD-10-CM

## 2024-10-07 DIAGNOSIS — Z76.0 MEDICATION REFILL: ICD-10-CM

## 2024-10-07 DIAGNOSIS — G44.209 TENSION-TYPE HEADACHE, NOT INTRACTABLE, UNSPECIFIED CHRONICITY PATTERN: ICD-10-CM

## 2024-10-07 DIAGNOSIS — Z01.411 ENCOUNTER FOR GYNECOLOGICAL EXAMINATION WITH ABNORMAL FINDING: Primary | ICD-10-CM

## 2024-10-07 PROBLEM — Z00.00 ANNUAL PHYSICAL EXAM: Status: RESOLVED | Noted: 2019-08-27 | Resolved: 2024-10-07

## 2024-10-07 RX ORDER — DROSPIRENONE AND ETHINYL ESTRADIOL 0.03MG-3MG
1 KIT ORAL DAILY
Qty: 84 TABLET | Refills: 3 | Status: SHIPPED | OUTPATIENT
Start: 2024-10-07

## 2024-10-08 NOTE — PROGRESS NOTES
Loida Benjamin is a 24 year old female  Patient's last menstrual period was 2024 (approximate).   Chief Complaint   Patient presents with    Gyn Exam     ANNUAL EXAM / BCP REFILL    Menstrual Problem     Getting spotting for 2 wks prior to period. Only change is increased weight recently. (+) frontal mild HA   .    OBSTETRICS HISTORY:     OB History    Para Term  AB Living   0 0 0 0 0 0   SAB IAB Ectopic Multiple Live Births   0 0 0 0 0       GYNE HISTORY:     Periods regular monthly      BCM:  OCP    History   Sexual Activity    Sexual activity: Not Currently        Pap Date: 07/10/20  Pap Result Notes: NEG PAP     / Mammo 17 Suspicious, 5/10/18 U/S breast biopsy          Latest Ref Rng & Units 7/10/2020     1:49 PM   RECENT PAP RESULTS   INTERPRETATION/RESULT: Negative for intraepithelial lesion or malignancy Negative for intraepithelial lesion or malignancy          MEDICAL HISTORY:     Past Medical History:    Feeding difficulties and mismanagement    OTHER DISEASES    blocled tear duct    UTI      Visual impairment    GLASSES AND CONTACTS     Past Surgical History:   Procedure Laterality Date    Other surgical history Right 2018    right breast biopsy  - benign     OB History    Para Term  AB Living   0 0 0 0 0 0   SAB IAB Ectopic Multiple Live Births   0 0 0 0 0        SOCIAL HISTORY:     Tobacco Use: Low Risk  (10/7/2024)    Patient History     Smoking Tobacco Use: Never     Smokeless Tobacco Use: Never     Passive Exposure: Not on file       FAMILY HISTORY:     Family History   Problem Relation Age of Onset    High Blood Pressure Maternal Grandmother     Diabetes Maternal Grandmother     Glaucoma Maternal Grandmother     High Blood Pressure Maternal Grandfather     Pancreatic Cancer Maternal Grandfather 81    Cancer Paternal Grandfather 79        Stomach    Breast Cancer Maternal Aunt     Diabetes Other         paternal great cousin    Cancer Other          mggm colon    Cancer Other         mgg aunts breast    Cancer Paternal Uncle 58        Larynx    Macular degeneration Neg        MEDICATIONS:       Current Outpatient Medications:     drospirenone-ethinyl estradiol 3-0.03 MG Oral Tab, Take 1 tablet by mouth daily., Disp: 84 tablet, Rfl: 3    ALLERGIES:     No Known Allergies      REVIEW OF SYSTEMS:     Constitutional:    denies fever / chills  Eyes:     denies blurred or double vision  Cardiovascular:  denies chest pain or palpitations  Respiratory:    denies shortness of breath  Gastrointestinal:  denies severe abdominal pain, frequent diarrhea or constipation, nausea / vomiting  Genitourinary:    denies dysuria, bothersome incontinence  Skin/Breast:   denies any breast pain, lumps, or discharge  Neurological:    denies frequent severe headaches  Psychiatric:   denies depression or anxiety, thoughts of harming self or others  Heme/Lymph:    denies easy bruising or bleeding      PHYSICAL EXAM:     Blood pressure 124/88, pulse 102, height 5' 1.7\" (1.567 m), weight 200 lb (90.7 kg), last menstrual period 09/11/2024, not currently breastfeeding.  Constitutional:  well developed, well nourished  Head/Face:  normocephalic  Neck/Thyroid: thyroid symmetric, no thyromegaly, no nodules, no adenopathy  Lymphatic: no abnormal supraclavicular or axillary adenopathy is noted  Breast:   normal without palpable masses, tenderness, asymmetry, nipple discharge, nipple retraction or skin changes  Abdomen:   soft, nontender, nondistended, no masses  Skin/Hair:  no unusual rashes or bruises  Extremities:  no edema, no cyanosis  Psychiatric:   oriented to time, place, person and situation. Appropriate mood and affect    Pelvic Exam:  External Genitalia:  normal appearance, hair distribution, and no lesions  Urethral Meatus:   normal in size, location, without lesions and prolapse  Bladder:    no fullness, masses or tenderness  Vagina:    normal appearance without lesions, no  abnormal discharge  Cervix:    normal without tenderness on motion  Uterus:    normal in size, contour, position, mobility, without tenderness  Adnexa:   normal without masses or tenderness  Perineum:   normal  Anus: no hemorroids         ASSESSMENT & PLAN:     Loida was seen today for gyn exam and menstrual problem.    Diagnoses and all orders for this visit:    Encounter for gynecological examination with abnormal finding    Intermenstrual bleeding  -     Prolactin; Future  -     TSH W Reflex To Free T4; Future    Tension-type headache, not intractable, unspecified chronicity pattern  -     Prolactin; Future  -     TSH W Reflex To Free T4; Future    Oral contraceptive pill surveillance    Medication refill  -     drospirenone-ethinyl estradiol 3-0.03 MG Oral Tab; Take 1 tablet by mouth daily.      Plan to check TSH / PRL given extra HA.  Encouraged to lose weight -- use Gingerd Pal & aerobic exercise 45-60 min 4-5 x / week w/ weight training also.  Consider switching ocps to 35 mcg pill. Consider endosee to r/o endometrial polyp    SUMMARY:  PAP:  next pap today per ASCCP guidelines.  BCM: OCP  STD screening: declines, condoms encouraged  Gardasil: received   updated  Depression screen:   Depression Screening (PHQ-2/PHQ-9): Over the LAST 2 WEEKS   Little interest or pleasure in doing things (over the last two weeks)?: Not at all    Feeling down, depressed, or hopeless (over the last two weeks)?: Not at all    PHQ-2 SCORE: 0          FOLLOWUP:     No follow-ups on file.    Note to patient and family:  The 21st Century Cures Act makes medical notes available to patients in the interest of transparency.  However, please be advised that this is a medical document.  It is intended as a peer to peer communication.  It is written in medical language and may contain abbreviations or verbiage that are technical and unfamiliar.  It may appear blunt or direct.  Medical documents are intended to carry relevant  information, facts as evident, and the clinical opinion of the practitioner.

## 2024-10-09 ENCOUNTER — LAB ENCOUNTER (OUTPATIENT)
Dept: LAB | Facility: HOSPITAL | Age: 24
End: 2024-10-09
Attending: OBSTETRICS & GYNECOLOGY
Payer: COMMERCIAL

## 2024-10-09 DIAGNOSIS — N92.3 INTERMENSTRUAL BLEEDING: ICD-10-CM

## 2024-10-09 DIAGNOSIS — G44.209 TENSION-TYPE HEADACHE, NOT INTRACTABLE, UNSPECIFIED CHRONICITY PATTERN: ICD-10-CM

## 2024-10-09 LAB
LAST PAP RESULT: NORMAL
PROLACTIN SERPL-MCNC: 6.4 NG/ML
TSI SER-ACNC: 1.07 MIU/ML (ref 0.55–4.78)

## 2024-10-09 PROCEDURE — 84146 ASSAY OF PROLACTIN: CPT

## 2024-10-09 PROCEDURE — 36415 COLL VENOUS BLD VENIPUNCTURE: CPT

## 2024-10-09 PROCEDURE — 84443 ASSAY THYROID STIM HORMONE: CPT

## 2024-11-18 ENCOUNTER — OFFICE VISIT (OUTPATIENT)
Dept: INTERNAL MEDICINE CLINIC | Facility: CLINIC | Age: 24
End: 2024-11-18

## 2024-11-18 VITALS
WEIGHT: 200 LBS | BODY MASS INDEX: 36.8 KG/M2 | SYSTOLIC BLOOD PRESSURE: 133 MMHG | HEIGHT: 61.7 IN | HEART RATE: 88 BPM | DIASTOLIC BLOOD PRESSURE: 85 MMHG

## 2024-11-18 DIAGNOSIS — M89.9: Primary | ICD-10-CM

## 2024-11-18 DIAGNOSIS — M54.2 NECK PAIN: ICD-10-CM

## 2024-11-18 DIAGNOSIS — R22.1 LOCALIZED SWELLING, MASS AND LUMP, NECK: ICD-10-CM

## 2024-11-18 PROCEDURE — 99214 OFFICE O/P EST MOD 30 MIN: CPT | Performed by: NURSE PRACTITIONER

## 2024-11-18 PROCEDURE — 3075F SYST BP GE 130 - 139MM HG: CPT | Performed by: NURSE PRACTITIONER

## 2024-11-18 PROCEDURE — 3079F DIAST BP 80-89 MM HG: CPT | Performed by: NURSE PRACTITIONER

## 2024-11-18 PROCEDURE — 3008F BODY MASS INDEX DOCD: CPT | Performed by: NURSE PRACTITIONER

## 2024-11-18 NOTE — PROGRESS NOTES
HPI:    Patient ID: Loida Benjamin is a 24 year old female.    HPI Follow up Gallbladder Polyp  24 year old female who is here for follow up.  She states she forgot again to schedule an appointment with a surgeon regarding the polyp in her gallbladder.  She will schedule this.  She has not had any RUQ pain.    Headaches  She said her headaches are improved and Excedrin has helped.    Lump behind right ear  She feels xiomy lump behind her right ear has not decreased in size.  Requesting [place]    New Problems/Observations  Indentation in her skull and a line across her forward.  This is new and she has not had any trauma or injury.    Wt Readings from Last 6 Encounters:   11/18/24 200 lb (90.7 kg)   10/07/24 200 lb (90.7 kg)   09/09/24 198 lb (89.8 kg)   07/29/24 190 lb (86.2 kg)   04/29/24 192 lb (87.1 kg)   03/19/24 190 lb (86.2 kg)      Immunization History   Administered Date(s) Administered    DTAP 06/15/1999, 08/09/1999, 10/11/1999, 07/10/2000, 04/25/2003    HEP A 09/07/2011    HEP A,Ped/Adol,(2 Dose) 08/11/2016    HEP B 04/10/1999, 05/11/1999    HEP B/HIB 04/10/2000    HIB 06/15/1999, 08/09/1999, 10/11/1999    HPV (Gardasil) 07/25/2011, 09/07/2011    Hpv Virus Vaccine 9 Ellen Im 01/02/2016    IPV 06/15/1999, 08/09/1999, 07/10/2000, 04/25/2003    Influenza 08/11/2010, 09/07/2011    Influenza A (H1N1) 11/10/2009    Influenza Vaccine, trivalent (IIV3), 0.5mL IM 09/12/2009    MMR 04/10/2000, 04/30/2004    Meningococcal-Menactra 07/02/2010, 08/11/2016    Pneumococcal (Prevnar 7) 07/10/2000, 10/09/2000    Rotavirus 3 Dose 06/15/1999    TDAP 07/02/2010, 07/17/2020    Varicella 09/03/2002, 07/02/2010       Past Medical History:    Feeding difficulties and mismanagement    OTHER DISEASES    blocled tear duct    UTI      Visual impairment    GLASSES AND CONTACTS      Past Surgical History:   Procedure Laterality Date    Other surgical history Right 2018    right breast biopsy  - benign      Social History      Socioeconomic History    Marital status: Single   Tobacco Use    Smoking status: Never    Smokeless tobacco: Never   Vaping Use    Vaping status: Never Used   Substance and Sexual Activity    Alcohol use: Not Currently    Drug use: Never    Sexual activity: Not Currently          Review of Systems   Constitutional:  Negative for chills, fatigue and fever.        Skull - Indentation and line across forehead   HENT:  Negative for congestion, ear discharge, ear pain, facial swelling, hearing loss, postnasal drip, sinus pressure, sore throat and trouble swallowing.    Eyes:  Negative for pain, discharge, redness and visual disturbance.   Respiratory:  Negative for cough, chest tightness, shortness of breath and wheezing.    Cardiovascular:  Negative for chest pain, palpitations and leg swelling.   Gastrointestinal:  Negative for abdominal distention, abdominal pain, constipation, diarrhea, nausea and vomiting.   Endocrine: Negative for cold intolerance, heat intolerance, polydipsia, polyphagia and polyuria.   Genitourinary:  Negative for difficulty urinating, dysuria, pelvic pain and vaginal bleeding.   Musculoskeletal:  Negative for back pain, gait problem, neck pain and neck stiffness.   Skin:  Negative for color change and rash.   Neurological:  Negative for dizziness, seizures, weakness and headaches.   Psychiatric/Behavioral:  Negative for agitation and sleep disturbance. The patient is not nervous/anxious.               Current Outpatient Medications   Medication Sig Dispense Refill    drospirenone-ethinyl estradiol 3-0.03 MG Oral Tab Take 1 tablet by mouth daily. 84 tablet 3     Allergies:Allergies[1]   PHYSICAL EXAM:   Physical Exam  Constitutional:       Appearance: Normal appearance. She is well-developed.   HENT:      Head: Normocephalic.        Right Ear: Tympanic membrane normal.      Left Ear: Tympanic membrane normal.      Nose: Nose normal.      Mouth/Throat:      Mouth: Mucous membranes are moist.       Pharynx: No oropharyngeal exudate or posterior oropharyngeal erythema.   Eyes:      General:         Right eye: No discharge.         Left eye: No discharge.      Pupils: Pupils are equal, round, and reactive to light.   Cardiovascular:      Rate and Rhythm: Normal rate and regular rhythm.      Heart sounds: Normal heart sounds. No murmur heard.     No friction rub. No gallop.   Pulmonary:      Effort: Pulmonary effort is normal. No respiratory distress.      Breath sounds: Normal breath sounds. No wheezing, rhonchi or rales.   Abdominal:      General: Bowel sounds are normal. There is no distension.      Palpations: Abdomen is soft. There is no mass.      Tenderness: There is no abdominal tenderness. There is no right CVA tenderness, left CVA tenderness or guarding.   Musculoskeletal:         General: No tenderness.      Cervical back: Normal range of motion and neck supple. No tenderness.      Right lower leg: No edema.      Left lower leg: No edema.   Lymphadenopathy:      Cervical: No cervical adenopathy.   Skin:     General: Skin is warm and dry.      Findings: No rash.   Neurological:      Mental Status: She is alert and oriented to person, place, and time.      Coordination: Coordination normal.      Gait: Gait normal.   Psychiatric:         Mood and Affect: Mood normal.         Behavior: Behavior normal.         Thought Content: Thought content normal.         Judgment: Judgment normal.       /85 (BP Location: Right arm, Patient Position: Sitting, Cuff Size: large)   Pulse 88   Ht 5' 1.7\" (1.567 m)   Wt 200 lb (90.7 kg)   LMP 11/06/2024 (Approximate)   BMI 36.94 kg/m²   Wt Readings from Last 2 Encounters:   11/18/24 200 lb (90.7 kg)   10/07/24 200 lb (90.7 kg)     Body mass index is 36.94 kg/m².(2)  Lab Results   Component Value Date    WBC 8.5 03/20/2024    RBC 4.58 03/20/2024    HGB 12.5 03/20/2024    HCT 39.4 03/20/2024    MCV 86.0 03/20/2024    MCH 27.3 03/20/2024    MCHC 31.7 03/20/2024     RDW 12.7 03/20/2024    .0 03/20/2024    MPV 8.7 11/13/2018      Lab Results   Component Value Date     (H) 03/20/2024    BUN 9 03/20/2024    BUNCREA 11.1 03/20/2024    CREATSERUM 0.81 03/20/2024    ANIONGAP 5 03/20/2024    GFRNAA 99 08/29/2019    GFRAA 114 08/29/2019    CA 9.7 03/20/2024    OSMOCALC 285 03/20/2024    ALKPHO 76 03/20/2024    AST 12 03/20/2024    ALT 7 (L) 03/20/2024    BILT 0.7 03/20/2024    TP 7.4 03/20/2024    ALB 4.5 03/20/2024    GLOBULIN 2.9 03/20/2024     03/20/2024    K 4.1 03/20/2024     03/20/2024    CO2 25.0 03/20/2024      Lab Results   Component Value Date     03/20/2024    A1C 5.6 03/20/2024      Lab Results   Component Value Date    CHOLEST 182 03/20/2024    TRIG 146 03/20/2024    HDL 70 (H) 03/20/2024    LDL 87 03/20/2024    VLDL 23 03/20/2024    NONHDLC 112 03/20/2024      Lab Results   Component Value Date    TSH 1.070 10/09/2024                ASSESSMENT/PLAN:     Problem List Items Addressed This Visit    None  Visit Diagnoses       Disorder of skull    -  Primary    Relevant Orders    XR SKULL 4+ VW (CPT=70260)    XR CERVICAL SPINE (2-3 VIEWS) (CPT=72040)    Neck pain        Relevant Orders    XR CERVICAL SPINE (2-3 VIEWS) (CPT=72040)    Localized swelling, mass and lump, neck        Relevant Orders    US HEAD/NECK (CPT=76536)               No orders of the defined types were placed in this encounter.      Meds This Visit:  Requested Prescriptions      No prescriptions requested or ordered in this encounter       Imaging & Referrals:  XR SKULL 4+ VW (CPT=70260)  XR CERVICAL SPINE (2-3 VIEWS) (CPT=72040)  US HEAD/NECK (OEJ=38138)         CITLALI Quiroz          [1] No Known Allergies

## 2024-11-23 ENCOUNTER — HOSPITAL ENCOUNTER (OUTPATIENT)
Dept: GENERAL RADIOLOGY | Facility: HOSPITAL | Age: 24
Discharge: HOME | End: 2024-11-23
Attending: NURSE PRACTITIONER
Payer: COMMERCIAL

## 2024-11-23 DIAGNOSIS — M54.2 NECK PAIN: ICD-10-CM

## 2024-11-23 DIAGNOSIS — M89.9: ICD-10-CM

## 2024-11-23 PROCEDURE — 72040 X-RAY EXAM NECK SPINE 2-3 VW: CPT | Performed by: NURSE PRACTITIONER

## 2024-11-23 PROCEDURE — 70260 X-RAY EXAM OF SKULL: CPT | Performed by: NURSE PRACTITIONER

## 2024-12-08 ENCOUNTER — APPOINTMENT (OUTPATIENT)
Dept: GENERAL RADIOLOGY | Age: 24
End: 2024-12-08
Attending: NURSE PRACTITIONER
Payer: COMMERCIAL

## 2024-12-08 ENCOUNTER — HOSPITAL ENCOUNTER (OUTPATIENT)
Dept: ULTRASOUND IMAGING | Age: 24
End: 2024-12-08
Attending: NURSE PRACTITIONER
Payer: COMMERCIAL

## 2024-12-08 ENCOUNTER — PATIENT MESSAGE (OUTPATIENT)
Dept: INTERNAL MEDICINE CLINIC | Facility: CLINIC | Age: 24
End: 2024-12-08

## 2024-12-08 ENCOUNTER — HOSPITAL ENCOUNTER (OUTPATIENT)
Dept: ULTRASOUND IMAGING | Age: 24
Discharge: HOME | End: 2024-12-08
Attending: NURSE PRACTITIONER
Payer: COMMERCIAL

## 2024-12-08 DIAGNOSIS — R22.1 LOCALIZED SWELLING, MASS AND LUMP, NECK: ICD-10-CM

## 2024-12-08 PROCEDURE — 76536 US EXAM OF HEAD AND NECK: CPT | Performed by: NURSE PRACTITIONER

## 2024-12-09 NOTE — TELEPHONE ENCOUNTER
LOUIE Andersen, APRN, please see [employer] message and advise. Thanks.    Patient completed x-ray of skull, cervical spine and US head/neck.    Please respond directly to the patient if no additional staff support is required.     No future appointments.

## 2024-12-17 ENCOUNTER — OFFICE VISIT (OUTPATIENT)
Dept: SURGERY | Facility: CLINIC | Age: 24
End: 2024-12-17

## 2024-12-17 ENCOUNTER — TELEPHONE (OUTPATIENT)
Dept: INTERNAL MEDICINE CLINIC | Facility: CLINIC | Age: 24
End: 2024-12-17

## 2024-12-17 VITALS — SYSTOLIC BLOOD PRESSURE: 153 MMHG | HEART RATE: 108 BPM | DIASTOLIC BLOOD PRESSURE: 93 MMHG

## 2024-12-17 DIAGNOSIS — K82.4 GALLBLADDER POLYP: Primary | ICD-10-CM

## 2024-12-17 DIAGNOSIS — R22.1 LUMP IN NECK: Primary | ICD-10-CM

## 2024-12-17 DIAGNOSIS — R22.0 HEAD LUMP: Primary | ICD-10-CM

## 2024-12-17 PROCEDURE — 3077F SYST BP >= 140 MM HG: CPT | Performed by: SURGERY

## 2024-12-17 PROCEDURE — 99204 OFFICE O/P NEW MOD 45 MIN: CPT | Performed by: SURGERY

## 2024-12-17 PROCEDURE — 3080F DIAST BP >= 90 MM HG: CPT | Performed by: SURGERY

## 2024-12-17 NOTE — H&P
HPI:    Patient ID: Loida Benjamin is a 24 year old female presenting with   Chief Complaint   Patient presents with    Gallbladder     Pt referred by CITLALI Andersen  regarding GB polyp.  Pt states she had pain by her rib - left side last yr.  Pt denies diarrhea, N&V.     .    Gallbladder        Past Medical History:    Feeding difficulties and mismanagement    OTHER DISEASES    blocled tear duct    UTI      Visual impairment    GLASSES AND CONTACTS     Past Surgical History:   Procedure Laterality Date    Other surgical history Right 2018    right breast biopsy  - benign     Family History   Problem Relation Age of Onset    High Blood Pressure Maternal Grandmother     Diabetes Maternal Grandmother     Glaucoma Maternal Grandmother     High Blood Pressure Maternal Grandfather     Pancreatic Cancer Maternal Grandfather 81    Cancer Paternal Grandfather 79        Stomach    Breast Cancer Maternal Aunt     Diabetes Other         paternal great cousin    Cancer Other         mggm colon    Cancer Other         mgg aunts breast    Cancer Paternal Uncle 58        Larynx    Macular degeneration Neg      Social History     Socioeconomic History    Marital status: Single     Spouse name: Not on file    Number of children: Not on file    Years of education: Not on file    Highest education level: Not on file   Occupational History    Not on file   Tobacco Use    Smoking status: Never    Smokeless tobacco: Never   Vaping Use    Vaping status: Never Used   Substance and Sexual Activity    Alcohol use: Not Currently    Drug use: Never    Sexual activity: Not Currently   Other Topics Concern    Not on file   Social History Narrative    Not on file     Social Drivers of Health     Financial Resource Strain: Not on file   Food Insecurity: Not on file   Transportation Needs: Not on file   Physical Activity: Not on file   Stress: Not on file   Social Connections: Unknown (5/3/2021)    Received from [facility], [facility]    Social Connections     Conversations with friends/family/neighbors per week: Not on file   Housing Stability: Not on file       Review of Systems   Constitutional: Negative.    HENT: Negative.     Eyes: Negative.    Respiratory: Negative.     Cardiovascular: Negative.    Gastrointestinal: Negative.    Endocrine: Negative.    Genitourinary: Negative.    Musculoskeletal: Negative.    Skin: Negative.    Allergic/Immunologic: Negative.    Neurological: Negative.    Hematological:  Does not bruise/bleed easily.   Psychiatric/Behavioral: Negative.             Current Outpatient Medications   Medication Sig Dispense Refill    drospirenone-ethinyl estradiol 3-0.03 MG Oral Tab Take 1 tablet by mouth daily. 84 tablet 3       Allergies:Allergies[1]   PHYSICAL EXAM:   BP (!) 153/93   Pulse 108   LMP 11/06/2024 (Approximate)   Physical Exam  Vitals reviewed.   Constitutional:       Appearance: Normal appearance. She is well-developed.   HENT:      Head: Normocephalic and atraumatic.   Cardiovascular:      Rate and Rhythm: Normal rate and regular rhythm.   Pulmonary:      Effort: Pulmonary effort is normal.      Breath sounds: Normal breath sounds.   Abdominal:      General: There is no distension.      Palpations: Abdomen is soft. There is no mass.      Tenderness: There is no abdominal tenderness. There is no guarding or rebound.   Musculoskeletal:         General: Normal range of motion.      Cervical back: Normal range of motion and neck supple.   Skin:     General: Skin is warm and dry.   Neurological:      Mental Status: She is alert and oriented to person, place, and time.   Psychiatric:         Speech: Speech normal.         Behavior: Behavior normal.                 ASSESSMENT/PLAN:   Diagnoses and all orders for this visit:    Gallbladder polyp    Asymptomatic sub cm gallbladder polyp.  Recommend continued observation with serial ultrasound to evaluate size of polyp.  If size  increase to greater than 1 cm or 0.5 cm over 6 months I would recommend an elective cholecystectomy.  Also, given pt's young age, this polyp will likely grow in the future and therefore she has the option of proceeding with cholecystectomy at her convenience.             Lemuel Robertson MD  12/17/2024       [1] No Known Allergies

## 2024-12-17 NOTE — TELEPHONE ENCOUNTER
Patient calling to follow up on 2024 patient email encounter. Patient states that symptoms still the same-no change.  Please see notes below and advise.     2024         24  9:30 AM  Naila Mukherjee RN routed this conversation to Micki Andersen APRN Hohe, Denise, RN       24  9:30 AM  Note      Micki Andersen, please advise     Patient (name and  verified) is calling for additional testing recommendations for symptoms discussed at her last office visit on 24.     Patient states that she has an indent on her forehead, left side near hairline. Patient also has the lump at the base of skull on left side near hairline.   She discussed all 3 with you at the office visit.      Patient states the ultrasound she completed was only for the bump behind her right ear and they did not address the other 2 areas of concern.               24  9:29 AM  Loida Benjamin contacted Naila Mukherjee RN   2024     [place]    24  9:42 AM  Rupal Rojo RN routed this conversation to Micki Andersen APRN Russell, April, RN   [place]    24  9:42 AM  Note      Patient called in to clarify. When she went in for the US of the head and neck she was told by the tech that they could only do behind the ear and they did not check lump back of neck and forehead indentation. She would like to know if there is another test that can/should be completed to look at these areas since it was not completed?      Patient does have appointment with general surgeon          Future Appointments   Date Time Provider Department Center   2024 11:00 AM Lemuel Robertson MD [facility]

## 2024-12-17 NOTE — TELEPHONE ENCOUNTER
Patient calling to follow up on messages below. Patient states she has been trying to get a hold of APRN Micki since Dec 8th.  Please also see Dec. 8th Jianshu message encounter.

## 2024-12-17 NOTE — TELEPHONE ENCOUNTER
Patient calling back ,verified name and date of birth.  She has additional questions for Micki Andersen after speaking to her on 12/9/24.    Micki Andersen, patient will be available  for a call back from you today at  [phone].

## 2024-12-18 NOTE — TELEPHONE ENCOUNTER
Spoke with patient earlier today    Requesting US of her forehead  Ultrasound of her neck    Ordered

## 2024-12-23 ENCOUNTER — HOSPITAL ENCOUNTER (OUTPATIENT)
Age: 24
Discharge: ANOTHER HOSPITAL | End: 2024-12-23
Attending: STUDENT IN AN ORGANIZED HEALTH CARE EDUCATION/TRAINING PROGRAM
Payer: COMMERCIAL

## 2024-12-23 ENCOUNTER — HOSPITAL ENCOUNTER (EMERGENCY)
Facility: HOSPITAL | Age: 24
Discharge: HOME | End: 2024-12-23
Attending: STUDENT IN AN ORGANIZED HEALTH CARE EDUCATION/TRAINING PROGRAM
Payer: COMMERCIAL

## 2024-12-23 ENCOUNTER — TELEPHONE (OUTPATIENT)
Dept: INTERNAL MEDICINE CLINIC | Facility: CLINIC | Age: 24
End: 2024-12-23

## 2024-12-23 ENCOUNTER — NURSE TRIAGE (OUTPATIENT)
Dept: INTERNAL MEDICINE CLINIC | Facility: CLINIC | Age: 24
End: 2024-12-23

## 2024-12-23 VITALS
BODY MASS INDEX: 36.79 KG/M2 | HEIGHT: 62 IN | TEMPERATURE: 100 F | HEART RATE: 90 BPM | WEIGHT: 199.94 LBS | SYSTOLIC BLOOD PRESSURE: 129 MMHG | OXYGEN SATURATION: 99 % | RESPIRATION RATE: 24 BRPM | DIASTOLIC BLOOD PRESSURE: 76 MMHG

## 2024-12-23 VITALS
SYSTOLIC BLOOD PRESSURE: 157 MMHG | TEMPERATURE: 100 F | RESPIRATION RATE: 20 BRPM | OXYGEN SATURATION: 98 % | DIASTOLIC BLOOD PRESSURE: 65 MMHG | HEART RATE: 144 BPM

## 2024-12-23 DIAGNOSIS — R05.9 COUGH, UNSPECIFIED TYPE: ICD-10-CM

## 2024-12-23 DIAGNOSIS — B34.9 VIRAL SYNDROME: Primary | ICD-10-CM

## 2024-12-23 DIAGNOSIS — R07.9 ACUTE CHEST PAIN: ICD-10-CM

## 2024-12-23 DIAGNOSIS — R00.0 TACHYCARDIA: Primary | ICD-10-CM

## 2024-12-23 LAB
ANION GAP SERPL CALC-SCNC: 8 MMOL/L (ref 0–18)
B-HCG UR QL: NEGATIVE
BASOPHILS # BLD AUTO: 0.03 X10(3) UL (ref 0–0.2)
BASOPHILS NFR BLD AUTO: 0.4 %
BILIRUB UR QL STRIP: NEGATIVE
BUN BLD-MCNC: 7 MG/DL (ref 9–23)
BUN/CREAT SERPL: 9 (ref 10–20)
CALCIUM BLD-MCNC: 10 MG/DL (ref 8.7–10.4)
CHLORIDE SERPL-SCNC: 107 MMOL/L (ref 98–112)
CLARITY UR: CLEAR
CO2 SERPL-SCNC: 22 MMOL/L (ref 21–32)
COLOR UR: YELLOW
CREAT BLD-MCNC: 0.78 MG/DL
DEPRECATED RDW RBC AUTO: 37.9 FL (ref 35.1–46.3)
EGFRCR SERPLBLD CKD-EPI 2021: 108 ML/MIN/1.73M2 (ref 60–?)
EOSINOPHIL # BLD AUTO: 0.04 X10(3) UL (ref 0–0.7)
EOSINOPHIL NFR BLD AUTO: 0.5 %
ERYTHROCYTE [DISTWIDTH] IN BLOOD BY AUTOMATED COUNT: 12.5 % (ref 11–15)
FLUAV + FLUBV RNA SPEC NAA+PROBE: NEGATIVE
FLUAV + FLUBV RNA SPEC NAA+PROBE: NEGATIVE
GLUCOSE BLD-MCNC: 85 MG/DL (ref 70–99)
GLUCOSE UR STRIP-MCNC: NEGATIVE MG/DL
HCT VFR BLD AUTO: 39.4 %
HGB BLD-MCNC: 12.8 G/DL
IMM GRANULOCYTES # BLD AUTO: 0.03 X10(3) UL (ref 0–1)
IMM GRANULOCYTES NFR BLD: 0.4 %
KETONES UR STRIP-MCNC: NEGATIVE MG/DL
LEUKOCYTE ESTERASE UR QL STRIP: NEGATIVE
LYMPHOCYTES # BLD AUTO: 0.48 X10(3) UL (ref 1–4)
LYMPHOCYTES NFR BLD AUTO: 5.7 %
MCH RBC QN AUTO: 27.2 PG (ref 26–34)
MCHC RBC AUTO-ENTMCNC: 32.5 G/DL (ref 31–37)
MCV RBC AUTO: 83.7 FL
MONOCYTES # BLD AUTO: 0.61 X10(3) UL (ref 0.1–1)
MONOCYTES NFR BLD AUTO: 7.2 %
NEUTROPHILS # BLD AUTO: 7.23 X10 (3) UL (ref 1.5–7.7)
NEUTROPHILS # BLD AUTO: 7.23 X10(3) UL (ref 1.5–7.7)
NEUTROPHILS NFR BLD AUTO: 85.8 %
NITRITE UR QL STRIP: NEGATIVE
OSMOLALITY SERPL CALC.SUM OF ELEC: 281 MOSM/KG (ref 275–295)
PH UR STRIP: 6.5 [PH]
PLATELET # BLD AUTO: 331 10(3)UL (ref 150–450)
POCT INFLUENZA A: NEGATIVE
POCT INFLUENZA B: NEGATIVE
POTASSIUM SERPL-SCNC: 3.9 MMOL/L (ref 3.5–5.1)
PROT UR STRIP-MCNC: NEGATIVE MG/DL
RBC # BLD AUTO: 4.71 X10(6)UL
RSV RNA SPEC NAA+PROBE: NEGATIVE
SARS-COV-2 RNA RESP QL NAA+PROBE: NOT DETECTED
SODIUM SERPL-SCNC: 137 MMOL/L (ref 136–145)
SP GR UR STRIP: 1.01
UROBILINOGEN UR STRIP-ACNC: <2 MG/DL
WBC # BLD AUTO: 8.4 X10(3) UL (ref 4–11)

## 2024-12-23 PROCEDURE — 85025 COMPLETE CBC W/AUTO DIFF WBC: CPT | Performed by: STUDENT IN AN ORGANIZED HEALTH CARE EDUCATION/TRAINING PROGRAM

## 2024-12-23 PROCEDURE — 99284 EMERGENCY DEPT VISIT MOD MDM: CPT

## 2024-12-23 PROCEDURE — 81025 URINE PREGNANCY TEST: CPT

## 2024-12-23 PROCEDURE — 93005 ELECTROCARDIOGRAM TRACING: CPT

## 2024-12-23 PROCEDURE — 81002 URINALYSIS NONAUTO W/O SCOPE: CPT

## 2024-12-23 PROCEDURE — 0241U SARS-COV-2/FLU A AND B/RSV BY PCR (GENEXPERT): CPT | Performed by: STUDENT IN AN ORGANIZED HEALTH CARE EDUCATION/TRAINING PROGRAM

## 2024-12-23 PROCEDURE — 80048 BASIC METABOLIC PNL TOTAL CA: CPT | Performed by: STUDENT IN AN ORGANIZED HEALTH CARE EDUCATION/TRAINING PROGRAM

## 2024-12-23 PROCEDURE — 93010 ELECTROCARDIOGRAM REPORT: CPT

## 2024-12-23 PROCEDURE — 96361 HYDRATE IV INFUSION ADD-ON: CPT

## 2024-12-23 PROCEDURE — 99214 OFFICE O/P EST MOD 30 MIN: CPT

## 2024-12-23 PROCEDURE — 96374 THER/PROPH/DIAG INJ IV PUSH: CPT

## 2024-12-23 PROCEDURE — 87502 INFLUENZA DNA AMP PROBE: CPT | Performed by: STUDENT IN AN ORGANIZED HEALTH CARE EDUCATION/TRAINING PROGRAM

## 2024-12-23 RX ORDER — ACETAMINOPHEN 500 MG
1000 TABLET ORAL ONCE
Status: COMPLETED | OUTPATIENT
Start: 2024-12-23 | End: 2024-12-23

## 2024-12-23 RX ORDER — KETOROLAC TROMETHAMINE 15 MG/ML
15 INJECTION, SOLUTION INTRAMUSCULAR; INTRAVENOUS ONCE
Status: COMPLETED | OUTPATIENT
Start: 2024-12-23 | End: 2024-12-23

## 2024-12-23 NOTE — ED INITIAL ASSESSMENT (HPI)
Patient arrives ambulatory with c/o cough, headache, achy all over, chest and neck pressure, feels like heart is beating fast x yesterday. Reports father is flu + and has same symptoms.

## 2024-12-23 NOTE — TELEPHONE ENCOUNTER
Action Requested: Summary for Provider     []  Critical Lab, Recommendations Needed  [] Need Additional Advice  [x]   FYI    []   Need Orders  [] Need Medications Sent to Pharmacy  []  Other     SUMMARY: c/o chest heaviness like a weighted blanket is on her chest. Instructed to go to ER or call 911    Reason for call: Chest Pressure  Onset: today    Patient called and stated her father was just diagnosed with influenza A. She stated she has been feeling like there is a weighted blanket on her chest and like her chest is very heavy. Patient stated she is coughing, does have a headache and a stuffy nose. Patients mom stated she believes they all have the flu. Informed the patient that based on her symptoms she should be evaluated in the ER and she should either go to the ER or call 911. Patients mom requesting appointment for chest xray and labs and medications, informed her that based on the patients symptoms she needed to be evaluated to rule out any cardiac problems. Gave the patients mom wait times for the ER. They verbalized understanding.           Reason for Disposition   Chest pain lasting longer than 5 minutes and pain is crushing, pressure-like, or heavy    Protocols used: Chest Pain-A-[place]

## 2024-12-23 NOTE — ED PROVIDER NOTES
Patient Seen in: [facility] [place]      History     Chief Complaint   Patient presents with    Cough/URI     Stated Complaint: flu    Subjective:   HPI      24-year-old female with no significant past medical history, who presents with her mother with concern for cough, congestion, chills and feeling warm but no fevers, palpitations, and chest heaviness all since this morning.  She is tachycardic at bedside but states she has been eating and drinking as usual with no vomiting or diarrhea and denies any usage of daily medications or albuterol. Of note, her mother tested positive for influenza today.    Objective:     Past Medical History:    Feeding difficulties and mismanagement    OTHER DISEASES    blocled tear duct    UTI      Visual impairment    GLASSES AND CONTACTS              Past Surgical History:   Procedure Laterality Date    Other surgical history Right 2018    right breast biopsy  - benign                Social History     Socioeconomic History    Marital status: Single   Tobacco Use    Smoking status: Never    Smokeless tobacco: Never   Vaping Use    Vaping status: Never Used   Substance and Sexual Activity    Alcohol use: Not Currently    Drug use: Never    Sexual activity: Not Currently     Social Drivers of Health      [facility]    Social Connections              Review of Systems    Positive for stated complaint: flu  Other systems are as noted in HPI.  Constitutional and vital signs reviewed.      All other systems reviewed and negative except as noted above.    Physical Exam     ED Triage Vitals [12/23/24 1644]   /65   Pulse (!) 132   Resp 20   Temp 99.9 °F (37.7 °C)   Temp src Oral   SpO2 100 %   O2 Device None (Room air)       Current Vitals:   Vital Signs  BP: 157/65  Pulse: (!) 144  Resp: 20  Temp: 99.9 °F (37.7 °C)  Temp src: Oral    Oxygen Therapy  SpO2: 98 %  O2 Device: None (Room air)        Physical  Exam    General: Awake, alert, comfortable on room air, in no distress, tolerating oral secretions, interactive  Pulmonary: Lungs CTA B, no wheezing, no conversational dyspnea  Neuro: Symmetrical facial expressions on gross observation, normal speech  HEENT: No periorbital edema or erythema, nonerythematous and nonedematous intact B/L TMs, no erythema or edema of the B/L ear canals, nasal congestion is present, nonerythematous and nonedematous B/L tonsils, no tonsillar exudates, no peritonsillar edema, mild posterior pharyngeal cobblestoning, uvula midline, tolerating oral secretions, normal speech, no submandibular edema  Neck: No anterior or posterior cervical invent apathy  Psych: Normal mood, normal affect    ED Course     Labs Reviewed   POCT FLU TEST - Normal    Narrative:     This assay is a rapid molecular in vitro test utilizing nucleic acid amplification of influenza A and B viral RNA.     EKG    Rate, intervals and axes as noted on EKG Report.  Rate: 127  Rhythm: Sinus tachycardia  Reading: T wave inversion in lead III which overall appears unchanged from EKG from 11/13/2018, no ST elevation, no STEMI, no ST depressions           MDM   Patient is awake, alert, comfortable on room air, in no distress, afebrile, lungs CTAB with no wheezing with no sign of acute bronchospasm, no sign of otitis media or otitis externa, no sign of tonsillitis or PTA or deep space infection, pulse oximetry was assessed in the room at bedside and HR ranges from 130-144, patient is symptomatic reporting palpitations and chest heaviness, her  influenza test is negative but her mother's is positive, could still have influenza which is false negative or viral-like illness but no vomiting or diarrhea or volume loss or fever to explain tachycardia, consider pneumonia versus pericarditis versus myocarditis versus cardiac injury versus PE and given limitations of the immediate care, recommended immediate assessment at higher level of  care at the emergency department.  Patient prefers assessment at the [facility] emergency department and will go straight there, she prefers to have her father drive her straight there.    Of note, patient had to urinate prior to discharge, will collect urine pregnancy and urine dip given tachycardia    Disposition and Plan     Clinical Impression:  1. Tachycardia    2. Cough, unspecified type    3. Acute chest pain         Disposition:  Ic to ed  12/23/2024  5:46 pm    Follow-up:  No follow-up provider specified.        Medications Prescribed:  Current Discharge Medication List            None

## 2024-12-24 LAB
ATRIAL RATE: 127 BPM
P AXIS: 33 DEGREES
P-R INTERVAL: 154 MS
Q-T INTERVAL: 294 MS
QRS DURATION: 74 MS
QTC CALCULATION (BEZET): 427 MS
R AXIS: -7 DEGREES
T AXIS: 7 DEGREES
VENTRICULAR RATE: 127 BPM

## 2024-12-24 NOTE — ED PROVIDER NOTES
History     Chief Complaint   Patient presents with    Arrythmia/Palpitations       HPI    24 year old female referred from urgent care for tach states she woke this morning with congestion, cough, chest tightness, sore throat.  Her mom tested positive for flu today, her dad tested positive for flu few days ago.  Patient's test was negative.  She has not had antipyretics recently.  No underlying pulmonary disease.  Noted that her heart rate was in the 140s at the urgent care was referred here for further workup.          Past Medical History:    Feeding difficulties and mismanagement    OTHER DISEASES    blocled tear duct    UTI      Visual impairment    GLASSES AND CONTACTS       Past Surgical History:   Procedure Laterality Date    Other surgical history Right 2018    right breast biopsy  - benign       Social History     Socioeconomic History    Marital status: Single   Tobacco Use    Smoking status: Never    Smokeless tobacco: Never   Vaping Use    Vaping status: Never Used   Substance and Sexual Activity    Alcohol use: Not Currently    Drug use: Never    Sexual activity: Not Currently     Social Drivers of Health      [facility]    Social Connections                   Physical Exam     ED Triage Vitals [12/23/24 1839]   BP (!) 138/92   Pulse (!) 134   Resp 22   Temp 99.6 °F (37.6 °C)   Temp src Oral   SpO2 98 %   O2 Device None (Room air)       Physical Exam  Constitutional:       General: She is not in acute distress.  HENT:      Nose: Congestion present.      Mouth/Throat:      Pharynx: Posterior oropharyngeal erythema present. No oropharyngeal exudate.   Eyes:      Extraocular Movements: Extraocular movements intact.   Cardiovascular:      Rate and Rhythm: Tachycardia present.      Pulses: Normal pulses.   Pulmonary:      Effort: Pulmonary effort is normal. No respiratory distress.      Breath sounds: Normal breath sounds.    Musculoskeletal:      Cervical back: Normal range of motion.   Skin:     General: Skin is warm.   Neurological:      General: No focal deficit present.      Mental Status: She is alert.              ED Course     Labs Reviewed   CBC WITH DIFFERENTIAL WITH PLATELET - Abnormal; Notable for the following components:       Result Value    Lymphocyte Absolute 0.48 (*)     All other components within normal limits   BASIC METABOLIC PANEL (8) - Abnormal; Notable for the following components:    BUN 7 (*)     BUN/CREA Ratio 9.0 (*)     All other components within normal limits   SARS-COV-2/FLU A AND B/RSV BY PCR (GENEXPERT) - Normal    Narrative:     This test is intended for the qualitative detection and differentiation of SARS-CoV-2, influenza A, influenza B, and respiratory syncytial virus (RSV) viral RNA in nasopharyngeal or nares swabs from individuals suspected of respiratory viral infection consistent with COVID-19 by their healthcare provider. Signs and symptoms of respiratory viral infection due to SARS-CoV-2, influenza, and RSV can be similar.    Test performed using the Xpert Xpress SARS-CoV-2/FLU/RSV (real time RT-PCR)  assay on the [employer]la[employer] instrument, [employer], [place] [address].   This test is being used under the Food and Drug Administration's Emergency Use Authorization.    The authorized Fact Sheet for Healthcare Providers for this assay is available upon request from the laboratory.     No results found.        MDM     Vitals:    12/23/24 1839 12/23/24 2039 12/23/24 2054 12/23/24 2124   BP: (!) 138/92 (!) 137/98 138/87 129/76   Pulse: (!) 134 109 94 90   Resp: 22 16 19 24   Temp: 99.6 °F (37.6 °C)      TempSrc: Oral      SpO2: 98% 100% 100% 99%   Weight: 90.7 kg      Height: 157.5 cm (5' 2\")          Dyspnea and sinus tachycardia, low-grade fever here.  Likely-year-old due to underlying viral syndrome.  Family considering oseltamivir.  I discussed risks and benefits.  Will repeat swab with [facility], may  have been false negative.  Will check basic labs, give fluids and antipyretics and reassess    ED Course as of 12/23/24 2300  ------------------------------------------------------------  Time: 12/23 1929  Comment: I reviewed patient's EKG done in the urgent care showing sinus tachycardia 7 bpm.  No ischemic changes  ------------------------------------------------------------  Time: 12/23 2046  Comment: Influenza here is negative.  Labs with reassuring hemoglobin and renal function.  No acidosis.  ------------------------------------------------------------  Time: 12/23 2050  Comment: Heart rate has come down appropriately.  Patient remains hemodynamically stable and comfortable.  Discussed supportive measures, discharged in stable condition.  Return precautions.         Disposition and Plan     Clinical Impression:  1. Viral syndrome        Disposition:  Discharge    Follow-up:  Tamara Weber MD    Follow up        Medications Prescribed:  Discharge Medication List as of 12/23/2024  9:48 PM

## 2024-12-24 NOTE — ED INITIAL ASSESSMENT (HPI)
Patient was just dx with the flu at  but her HR was in the 140s so they wanted her to come to the ER. EKG was completes at the . States she had nausea but denies Vomiting/diarrhea. +Chest tightness.

## 2024-12-31 ENCOUNTER — PATIENT OUTREACH (OUTPATIENT)
Dept: CASE MANAGEMENT | Age: 24
End: 2024-12-31

## 2024-12-31 NOTE — PROGRESS NOTES
ED follow up.    Tamara Woods MD  Family Medicine  [address]  [address]  [place], [place]    Attempt #1:  Left message on voicemail for patient to call transitions specialist back to schedule follow up appointments. Provided Transitions specialist scheduling phone number [phone].

## 2025-01-03 NOTE — PROGRESS NOTES
ED follow up. (Discharged 12/23/24)     CITLALI Quiroz  Internal Medicine  [address] [place] [address]  [phone]  Multiple attempts w/no calls back; no appointment made    Attempt #2:  Left message on voicemail for patient to call transitions specialist back to schedule follow up appointments. Provided Transitions specialist scheduling phone number [phone]. Closing encounter. Will re-open if patient returns call.

## 2025-01-16 ENCOUNTER — HOSPITAL ENCOUNTER (OUTPATIENT)
Dept: ULTRASOUND IMAGING | Facility: HOSPITAL | Age: 25
Discharge: HOME | End: 2025-01-16
Attending: NURSE PRACTITIONER
Payer: COMMERCIAL

## 2025-01-16 DIAGNOSIS — R22.0 HEAD LUMP: ICD-10-CM

## 2025-01-16 DIAGNOSIS — R22.1 LUMP IN NECK: ICD-10-CM

## 2025-01-16 PROCEDURE — 76536 US EXAM OF HEAD AND NECK: CPT | Performed by: NURSE PRACTITIONER

## 2025-01-17 ENCOUNTER — PATIENT MESSAGE (OUTPATIENT)
Dept: INTERNAL MEDICINE CLINIC | Facility: CLINIC | Age: 25
End: 2025-01-17

## 2025-01-17 NOTE — TELEPHONE ENCOUNTER
Advised that I do see she arrived and had ultrasound done. Results are not posted yet but advised that we would contact her after Micik reviews the results. She verbalized understanding.

## 2025-01-20 ENCOUNTER — OFFICE VISIT (OUTPATIENT)
Dept: OTOLARYNGOLOGY | Facility: CLINIC | Age: 25
End: 2025-01-20

## 2025-01-20 ENCOUNTER — TELEPHONE (OUTPATIENT)
Dept: INTERNAL MEDICINE CLINIC | Facility: CLINIC | Age: 25
End: 2025-01-20

## 2025-01-20 DIAGNOSIS — R59.0 LAD (LYMPHADENOPATHY), POSTAURICULAR: Primary | ICD-10-CM

## 2025-01-20 PROCEDURE — 99213 OFFICE O/P EST LOW 20 MIN: CPT | Performed by: OTOLARYNGOLOGY

## 2025-01-20 NOTE — TELEPHONE ENCOUNTER
Patient calling on ultrasound results from 1/16/2025.  Saw results on MyChart but did not understand them. Advised patient that once there is a final result it automatically goes into [employer] even before the doctor gets a chance to review them. Doctor's review all results and will send a message through [employer] with recommendations or will send a message to the nurses to call you with the recommendations. If it has been more than 7 days and has not heard anything to give the office a call back. Patient agreed. In the meantime, went over radiologists notes with patient.

## 2025-01-20 NOTE — PROGRESS NOTES
NEW PATIENT PROGRESS NOTE  OTOLOGY/OTOLARYNGOLOGY    REF MD:  Micki Andersen, APRN  [address] [place] [address]    PCP: Tamara Medina MD    CHIEF COMPLAINT:    Chief Complaint   Patient presents with    Mass     Bump behind right ear x4 months  Pt reports she had an US done     HISTORY OF PRESENT ILLNESS: Loida Benjamin is a 24 year old female presents for evaluation of a lump behind her right ear that has been present since September 2024. She denies any associated pain and notes that the lump has remained the same size since its onset. An ultrasound performed on 12/09/2024 showed a small, non-enlarged retroauricular lymph node. She has been referred here for further evaluation.    PAST MEDICAL HISTORY:    Past Medical History:    Feeding difficulties and mismanagement    OTHER DISEASES    blocled tear duct    UTI      Visual impairment    GLASSES AND CONTACTS       PAST SURGICAL HISTORY:    Past Surgical History:   Procedure Laterality Date    Other surgical history Right 2018    right breast biopsy  - benign       Medications Ordered Prior to Encounter[1]    Allergies: Allergies[2]    SOCIAL HISTORY:    Social History     Tobacco Use    Smoking status: Never    Smokeless tobacco: Never   Substance Use Topics    Alcohol use: Not Currently       Family History   Problem Relation Age of Onset    High Blood Pressure Maternal Grandmother     Diabetes Maternal Grandmother     Glaucoma Maternal Grandmother     High Blood Pressure Maternal Grandfather     Pancreatic Cancer Maternal Grandfather 81    Cancer Paternal Grandfather 79        Stomach    Breast Cancer Maternal Aunt     Diabetes Other         paternal great cousin    Cancer Other         mggm colon    Cancer Other         mgg aunts breast    Cancer Paternal Uncle 58        Larynx    Macular degeneration Neg        REVIEW OF SYSTEMS:   Per HPI    EXAMINATION:  I washed my hands with an alcohol-based hand gel prior to  examination  Constitutional:   --Vitals: Last menstrual period 12/05/2024, not currently breastfeeding.  General: no apparent distress, well-developed, conversant  Psych: affect pleasant and appropriate for age, alert and oriented  Neuro: Cranial nerves: EOMI, Facial sensation intact to touch, palate elevates midline, tongue protrudes midline, shoulder shrug intact bilateral, facial movement normal bilateral  Respiratory: No stridor, stertor or increased work of breathing  ENT:  --Nose: no external nasal deformity, anterior rhinoscopy: Septum midline, no inferior turbinate hypertrophy, mucosa healthy, no rhinorrhea  --OC/OP: No trismus. No masses or lesions noted over the gingiva, buccal mucosa, tongue, FOM, hard/soft palate, tonsillar pillars, posterior pharyngeal wall. Tonsils are symmetric and soft. FOM/BOT are soft.   --Neck: No palpable cervical lymphadenopathy, no thyromegaly, no masses or lesions over the bilateral submandibular or parotid glands  --Ear: (bilateral ears were examined under binocular microscopy)  Right ear microscopic exam:  Postauricular: 4mm palpable lesion on the posterior inferior border of the mastoid  Pinna: Normal, no lesions or masses.  Mastoid: Nontender on palpation.   External auditory canal: Clear, no masses or lesions.  Tympanic membrane: Intact, no lesions, normal landmarks.  Middle ear: Aerated.    Left ear microscopic exam:  Pinna: Normal, no lesions or masses.  Mastoid: Nontender on palpation.   External auditory canal: Clear, no masses or lesions.  Tympanic membrane: Intact, no lesions, normal landmarks.  Middle ear: Aerated.    US Head/Neck 12/09/2024   FINDINGS:  RIGHT neck/retro irregular region: Hypoechoic node the fat muscle interface in the retroauricular region measuring 5 x 3 x 6 mm with fatty hilus in central vascular pedicle.     OTHER: No masses or adenopathy.                 Impression   CONCLUSION:  1. Small nonenlarged retroauricular lymph node chronic for the  patient's palpable abnormality.       ASSESSMENT/PLAN:  Loida Benjamin is a 24 year old female with   No diagnosis found.     IMPRESSION:  Benign appearing, non-enlarged postauricular lymph node in the postauricular region    PLAN:  -Provided reassurance that this finding does not require further evaluation and is normal  -Follow-up as needed     Situation reviewed with the patient in detail.    Matty Jeffers MD  Otology/Otolaryngology  [address] [place] [address]  Phone [phone]  Fax [phone]           [1]   Current Outpatient Medications on File Prior to Visit   Medication Sig Dispense Refill    drospirenone-ethinyl estradiol 3-0.03 MG Oral Tab Take 1 tablet by mouth daily. 84 tablet 3     No current facility-administered medications on file prior to visit.   [2] No Known Allergies

## 2025-01-23 NOTE — TELEPHONE ENCOUNTER
Your tests are normal.     Micki Andersen DNP  Working with    Written by CITLALI Quiroz on 1/20/2025 11:55 AM CST  Seen by patient Loida Mcdonald Sourav on 1/20/2025  4:59 PM

## 2025-01-25 NOTE — TELEPHONE ENCOUNTER
Mother reports pt was seen on 8/14 for med check and OCP was changed to SASHA (MENDY). Mother states she received info from her pharmacy that there is a co-pay of $350.  Mother states she cannot afford this and requesting that [facility] review pt's hx (mood swings on chlorhexidine no

## 2025-01-30 ENCOUNTER — PATIENT MESSAGE (OUTPATIENT)
Dept: INTERNAL MEDICINE CLINIC | Facility: CLINIC | Age: 25
End: 2025-01-30

## 2025-01-31 NOTE — TELEPHONE ENCOUNTER
Patient has HMO insurance .  [employer] message sent with instruction to contact the office per triage protocol.

## 2025-03-07 DIAGNOSIS — Z76.0 MEDICATION REFILL: ICD-10-CM

## 2025-03-08 RX ORDER — DROSPIRENONE AND ETHINYL ESTRADIOL 0.03MG-3MG
1 KIT ORAL DAILY
Qty: 84 TABLET | Refills: 3 | OUTPATIENT
Start: 2025-03-08

## 2025-03-08 NOTE — TELEPHONE ENCOUNTER
Last annual - 10/7/2024  Last pap - 10/7/2024, normal  Last refill - 10/7/2024, #84 with 3 refills    Refills denied.  Patient should have refills available.

## 2025-03-25 ENCOUNTER — OFFICE VISIT (OUTPATIENT)
Dept: INTERNAL MEDICINE CLINIC | Facility: CLINIC | Age: 25
End: 2025-03-25

## 2025-03-25 ENCOUNTER — LAB ENCOUNTER (OUTPATIENT)
Dept: LAB | Age: 25
End: 2025-03-25
Attending: NURSE PRACTITIONER
Payer: COMMERCIAL

## 2025-03-25 VITALS
HEIGHT: 62 IN | WEIGHT: 204 LBS | OXYGEN SATURATION: 100 % | DIASTOLIC BLOOD PRESSURE: 82 MMHG | SYSTOLIC BLOOD PRESSURE: 139 MMHG | HEART RATE: 97 BPM | BODY MASS INDEX: 37.54 KG/M2 | RESPIRATION RATE: 16 BRPM | TEMPERATURE: 97 F

## 2025-03-25 DIAGNOSIS — R00.0 TACHYCARDIA: ICD-10-CM

## 2025-03-25 DIAGNOSIS — Z00.00 ANNUAL PHYSICAL EXAM: ICD-10-CM

## 2025-03-25 DIAGNOSIS — E55.9 VITAMIN D DEFICIENCY: ICD-10-CM

## 2025-03-25 DIAGNOSIS — E53.8 VITAMIN B 12 DEFICIENCY: ICD-10-CM

## 2025-03-25 DIAGNOSIS — M54.2 NECK PAIN: ICD-10-CM

## 2025-03-25 DIAGNOSIS — K82.4 GALLBLADDER POLYP: ICD-10-CM

## 2025-03-25 DIAGNOSIS — Z00.00 ANNUAL PHYSICAL EXAM: Primary | ICD-10-CM

## 2025-03-25 DIAGNOSIS — K82.4 POLYP OF GALLBLADDER: ICD-10-CM

## 2025-03-25 LAB
ALBUMIN SERPL-MCNC: 4.5 G/DL (ref 3.2–4.8)
ALBUMIN/GLOB SERPL: 1.5 {RATIO} (ref 1–2)
ALP LIVER SERPL-CCNC: 78 U/L
ALT SERPL-CCNC: 13 U/L
ANION GAP SERPL CALC-SCNC: 10 MMOL/L (ref 0–18)
AST SERPL-CCNC: 18 U/L (ref ?–34)
BILIRUB SERPL-MCNC: 0.6 MG/DL (ref 0.3–1.2)
BUN BLD-MCNC: 10 MG/DL (ref 9–23)
BUN/CREAT SERPL: 13.2 (ref 10–20)
CALCIUM BLD-MCNC: 9.5 MG/DL (ref 8.7–10.4)
CHLORIDE SERPL-SCNC: 104 MMOL/L (ref 98–112)
CHOLEST SERPL-MCNC: 213 MG/DL (ref ?–200)
CO2 SERPL-SCNC: 24 MMOL/L (ref 21–32)
CREAT BLD-MCNC: 0.76 MG/DL
DEPRECATED RDW RBC AUTO: 40.3 FL (ref 35.1–46.3)
EGFRCR SERPLBLD CKD-EPI 2021: 111 ML/MIN/1.73M2 (ref 60–?)
ERYTHROCYTE [DISTWIDTH] IN BLOOD BY AUTOMATED COUNT: 13.1 % (ref 11–15)
FASTING PATIENT LIPID ANSWER: YES
FASTING STATUS PATIENT QL REPORTED: YES
GLOBULIN PLAS-MCNC: 3.1 G/DL (ref 2–3.5)
GLUCOSE BLD-MCNC: 92 MG/DL (ref 70–99)
HCT VFR BLD AUTO: 39.5 %
HDLC SERPL-MCNC: 84 MG/DL (ref 40–59)
HGB BLD-MCNC: 12.9 G/DL
LDLC SERPL CALC-MCNC: 109 MG/DL (ref ?–100)
MCH RBC QN AUTO: 27.8 PG (ref 26–34)
MCHC RBC AUTO-ENTMCNC: 32.7 G/DL (ref 31–37)
MCV RBC AUTO: 85.1 FL
NONHDLC SERPL-MCNC: 129 MG/DL (ref ?–130)
OSMOLALITY SERPL CALC.SUM OF ELEC: 285 MOSM/KG (ref 275–295)
PLATELET # BLD AUTO: 347 10(3)UL (ref 150–450)
POTASSIUM SERPL-SCNC: 4.5 MMOL/L (ref 3.5–5.1)
PROT SERPL-MCNC: 7.6 G/DL (ref 5.7–8.2)
RBC # BLD AUTO: 4.64 X10(6)UL
SODIUM SERPL-SCNC: 138 MMOL/L (ref 136–145)
TRIGL SERPL-MCNC: 114 MG/DL (ref 30–149)
TSI SER-ACNC: 1.11 UIU/ML (ref 0.55–4.78)
VIT B12 SERPL-MCNC: 367 PG/ML (ref 211–911)
VIT D+METAB SERPL-MCNC: 16.5 NG/ML (ref 30–100)
VLDLC SERPL CALC-MCNC: 19 MG/DL (ref 0–30)
WBC # BLD AUTO: 7.2 X10(3) UL (ref 4–11)

## 2025-03-25 PROCEDURE — 82306 VITAMIN D 25 HYDROXY: CPT

## 2025-03-25 PROCEDURE — 36415 COLL VENOUS BLD VENIPUNCTURE: CPT

## 2025-03-25 PROCEDURE — 80061 LIPID PANEL: CPT

## 2025-03-25 PROCEDURE — 82607 VITAMIN B-12: CPT

## 2025-03-25 PROCEDURE — 85027 COMPLETE CBC AUTOMATED: CPT

## 2025-03-25 PROCEDURE — 3075F SYST BP GE 130 - 139MM HG: CPT | Performed by: NURSE PRACTITIONER

## 2025-03-25 PROCEDURE — 84443 ASSAY THYROID STIM HORMONE: CPT

## 2025-03-25 PROCEDURE — 3079F DIAST BP 80-89 MM HG: CPT | Performed by: NURSE PRACTITIONER

## 2025-03-25 PROCEDURE — 80053 COMPREHEN METABOLIC PANEL: CPT

## 2025-03-25 PROCEDURE — 99395 PREV VISIT EST AGE 18-39: CPT | Performed by: NURSE PRACTITIONER

## 2025-03-25 PROCEDURE — 3008F BODY MASS INDEX DOCD: CPT | Performed by: NURSE PRACTITIONER

## 2025-03-25 RX ORDER — ERGOCALCIFEROL 1.25 MG/1
50000 CAPSULE, LIQUID FILLED ORAL WEEKLY
Qty: 12 CAPSULE | Refills: 0 | Status: SHIPPED | OUTPATIENT
Start: 2025-03-25 | End: 2025-06-11

## 2025-03-25 NOTE — PROGRESS NOTES
HPI:    Patient ID: Loida Benjamin is a 24 year old female.  Follow with GYNE  HPI Physical Exam  24 year old female is here for annual physical exam   She is here to discuss health maintenance issues.   Right side of her throat she feels a pinging sensation.    The following issues she would like to discuss at this visit    Nasal Congestion    Face becomes red after eating certain foods    Random increased heart rate.    Random Chills    Does not vape  No smoking  No alcohol  No cannibals     Exercising    Wt Readings from Last 6 Encounters:   03/25/25 204 lb (92.5 kg)   12/23/24 199 lb 15.3 oz (90.7 kg)   11/18/24 200 lb (90.7 kg)   10/07/24 200 lb (90.7 kg)   09/09/24 198 lb (89.8 kg)   07/29/24 190 lb (86.2 kg)        Immunization History   Administered Date(s) Administered    DTAP 06/15/1999, 08/09/1999, 10/11/1999, 07/10/2000, 04/25/2003    HEP A 09/07/2011    HEP A,Ped/Adol,(2 Dose) 08/11/2016    HEP B 04/10/1999, 05/11/1999    HEP B/HIB 04/10/2000    HIB 06/15/1999, 08/09/1999, 10/11/1999    HPV (Gardasil) 07/25/2011, 09/07/2011    Hpv Virus Vaccine 9 Ellen Im 01/02/2016    IPV 06/15/1999, 08/09/1999, 07/10/2000, 04/25/2003    Influenza 08/11/2010, 09/07/2011    Influenza A (H1N1) 11/10/2009    Influenza Vaccine, trivalent (IIV3), 0.5mL IM 09/12/2009    MMR 04/10/2000, 04/30/2004    Meningococcal-Menactra 07/02/2010, 08/11/2016    Pneumococcal (Prevnar 7) 07/10/2000, 10/09/2000    Rotavirus 3 Dose 06/15/1999    TDAP 07/02/2010, 07/17/2020    Varicella 09/03/2002, 07/02/2010       Past Medical History:    Feeding difficulties and mismanagement    OTHER DISEASES    blocled tear duct    UTI      Visual impairment    GLASSES AND CONTACTS      Past Surgical History:   Procedure Laterality Date    Other surgical history Right 2018    right breast biopsy  - benign      Social History     Socioeconomic History    Marital status: Single   Tobacco Use    Smoking status: Never    Smokeless tobacco: Never    Vaping Use    Vaping status: Never Used   Substance and Sexual Activity    Alcohol use: Not Currently    Drug use: Never    Sexual activity: Not Currently          Review of Systems   Constitutional:  Positive for chills. Negative for fatigue and fever.   HENT:  Positive for congestion and postnasal drip. Negative for ear discharge, ear pain, facial swelling, hearing loss, sinus pressure, sore throat, trouble swallowing and voice change.    Eyes:  Negative for pain, discharge, redness and visual disturbance.   Respiratory:  Positive for shortness of breath. Negative for cough, chest tightness and wheezing.    Cardiovascular:  Positive for palpitations. Negative for chest pain and leg swelling.   Gastrointestinal:  Negative for abdominal distention, abdominal pain, constipation, diarrhea, nausea and vomiting.   Endocrine: Negative for cold intolerance, heat intolerance, polydipsia, polyphagia and polyuria.   Genitourinary:  Negative for difficulty urinating, dysuria, pelvic pain, vaginal bleeding and vaginal discharge.   Musculoskeletal:  Negative for back pain, gait problem, neck pain and neck stiffness.   Skin:  Negative for color change and rash.   Neurological:  Negative for dizziness, seizures, weakness and headaches.   Psychiatric/Behavioral:  Negative for agitation, dysphoric mood and sleep disturbance. The patient is nervous/anxious.               Current Outpatient Medications   Medication Sig Dispense Refill    drospirenone-ethinyl estradiol 3-0.03 MG Oral Tab Take 1 tablet by mouth daily. 84 tablet 3     Allergies:Allergies[1]   PHYSICAL EXAM:   Physical Exam  /82 (BP Location: Right arm, Patient Position: Sitting, Cuff Size: adult)   Pulse 97   Temp 97 °F (36.1 °C) (Temporal)   Resp 16   Ht 5' 2\" (1.575 m)   Wt 204 lb (92.5 kg)   LMP 02/27/2025 (Approximate)   SpO2 100%   BMI 37.31 kg/m²   Wt Readings from Last 2 Encounters:   03/25/25 204 lb (92.5 kg)   12/23/24 199 lb 15.3 oz (90.7 kg)      Body mass index is 37.31 kg/m².(2)  Lab Results   Component Value Date    WBC 8.4 12/23/2024    RBC 4.71 12/23/2024    HGB 12.8 12/23/2024    HCT 39.4 12/23/2024    MCV 83.7 12/23/2024    MCH 27.2 12/23/2024    MCHC 32.5 12/23/2024    RDW 12.5 12/23/2024    .0 12/23/2024    MPV 8.7 11/13/2018      Lab Results   Component Value Date    GLU 85 12/23/2024    BUN 7 (L) 12/23/2024    BUNCREA 9.0 (L) 12/23/2024    CREATSERUM 0.78 12/23/2024    ANIONGAP 8 12/23/2024    GFRNAA 99 08/29/2019    GFRAA 114 08/29/2019    CA 10.0 12/23/2024    OSMOCALC 281 12/23/2024    ALKPHO 76 03/20/2024    AST 12 03/20/2024    ALT 7 (L) 03/20/2024    BILT 0.7 03/20/2024    TP 7.4 03/20/2024    ALB 4.5 03/20/2024    GLOBULIN 2.9 03/20/2024     12/23/2024    K 3.9 12/23/2024     12/23/2024    CO2 22.0 12/23/2024      Lab Results   Component Value Date     03/20/2024    A1C 5.6 03/20/2024      Lab Results   Component Value Date    CHOLEST 182 03/20/2024    TRIG 146 03/20/2024    HDL 70 (H) 03/20/2024    LDL 87 03/20/2024    VLDL 23 03/20/2024    NONHDLC 112 03/20/2024      Lab Results   Component Value Date    TSH 1.070 10/09/2024                ASSESSMENT/PLAN:     Problem List Items Addressed This Visit       Annual physical exam - Primary     Normal exam.  Labs as ordered.  Skin check normal.  No significant abnormal nevi.  No cervical or inguinal lymphadenopathy.  Hernial orifices intact.    Immunizations- UP to date         Relevant Orders    Comp Metabolic Panel (14)    Lipid Panel    Vitamin D, 25-Hydroxy    Vitamin B12    TSH W Reflex To Free T4    CBC, NO DIFFERENTIAL/PLATELET    Gallbladder polyp     Patient had followed up with surgeon and they had recommended her follow-up with another ultrasound of her gallbladder    Plan  Ultrasound of gallbladder  If there is increased size of this polyp they had recommended cholecystectomy and this was discussed at this visit          Other Visit Diagnoses        Vitamin D deficiency        Relevant Orders    Vitamin D, 25-Hydroxy    Vitamin B 12 deficiency        Relevant Orders    Vitamin B12    Neck pain        Relevant Orders    US THYROID (CPT=76536)    Tachycardia        Relevant Orders    CARD ZIO EXTENDED MONITOR 3-7 DAYS (CPT=93054/46890)    Polyp of gallbladder        Relevant Orders    US GALLBLADDER (CPT=76705)               Orders Placed This Encounter   Procedures    Comp Metabolic Panel (14)    Lipid Panel    Vitamin D, 25-Hydroxy    Vitamin B12    TSH W Reflex To Free T4    CBC, NO DIFFERENTIAL/PLATELET       Meds This Visit:  Requested Prescriptions      No prescriptions requested or ordered in this encounter       Imaging & Referrals:  US THYROID (CPT=76536)  US GALLBLADDER (CPT=76705)  CARD ZIO EXTENDED MONITOR 3-7 DAYS (TOF=81818/69294)         CITLALI Quiroz          [1] No Known Allergies

## 2025-03-25 NOTE — ASSESSMENT & PLAN NOTE
Patient had followed up with surgeon and they had recommended her follow-up with another ultrasound of her gallbladder    Plan  Ultrasound of gallbladder  If there is increased size of this polyp they had recommended cholecystectomy and this was discussed at this visit

## 2025-03-25 NOTE — ASSESSMENT & PLAN NOTE
Normal exam.  Labs as ordered.  Skin check normal.  No significant abnormal nevi.  No cervical or inguinal lymphadenopathy.  Hernial orifices intact.    Immunizations- UP to date

## 2025-03-27 ENCOUNTER — HOSPITAL ENCOUNTER (OUTPATIENT)
Dept: CV DIAGNOSTICS | Facility: HOSPITAL | Age: 25
Discharge: HOME | End: 2025-03-27
Attending: NURSE PRACTITIONER
Payer: COMMERCIAL

## 2025-03-27 DIAGNOSIS — R00.0 TACHYCARDIA: ICD-10-CM

## 2025-03-27 PROCEDURE — 93243 EXT ECG>48HR<7D SCAN A/R: CPT | Performed by: NURSE PRACTITIONER

## 2025-03-27 PROCEDURE — 93242 EXT ECG>48HR<7D RECORDING: CPT | Performed by: NURSE PRACTITIONER

## 2025-04-06 ENCOUNTER — HOSPITAL ENCOUNTER (OUTPATIENT)
Dept: ULTRASOUND IMAGING | Age: 25
End: 2025-04-06
Attending: NURSE PRACTITIONER
Payer: COMMERCIAL

## 2025-04-06 ENCOUNTER — HOSPITAL ENCOUNTER (OUTPATIENT)
Dept: ULTRASOUND IMAGING | Age: 25
Discharge: HOME | End: 2025-04-06
Attending: NURSE PRACTITIONER
Payer: COMMERCIAL

## 2025-04-06 DIAGNOSIS — M54.2 NECK PAIN: ICD-10-CM

## 2025-04-06 PROCEDURE — 76536 US EXAM OF HEAD AND NECK: CPT | Performed by: NURSE PRACTITIONER

## (undated) DIAGNOSIS — Z78.9 USES BIRTH CONTROL: ICD-10-CM

## (undated) DEVICE — SUT MCRYL 4-0 18IN PS-2 ABSRB UD 19MM 3/8 CIR

## (undated) DEVICE — DRAPE TAPE: Brand: CONVERTORS

## (undated) DEVICE — 12 ML SYRINGE LUER-LOCK TIP: Brand: MONOJECT

## (undated) DEVICE — Device: Brand: JELCO

## (undated) DEVICE — YANKAUER,FLEXIBLE HANDLE,REGLR CAPACITY: Brand: MEDLINE INDUSTRIES, INC.

## (undated) DEVICE — CLIP LIG M BLU TI HRT SHP WIRE HORZ

## (undated) DEVICE — SUT PERMA- 2-0 18IN FS NABSRB BLK 26MM 3/8

## (undated) DEVICE — PACK,UNIVERSAL,SPLIT,II: Brand: MEDLINE

## (undated) DEVICE — 3M(TM) STERI-STRIP(TM) ANTIMICROBIAL SKIN CLOSURES (REINFORCED) A1846: Brand: 3M™ STERI-STRIP™

## (undated) DEVICE — ADHESIVE LIQ 2/3ML VI MASTISOL

## (undated) DEVICE — PAD,ABDOMINAL,8"X7.5",STERILE,LF,1/PK: Brand: MEDLINE

## (undated) DEVICE — MINOR GENERAL: Brand: MEDLINE INDUSTRIES, INC.

## (undated) DEVICE — BRA SURG ELIZ PINK L

## (undated) DEVICE — GAMMEX® PI HYBRID SIZE 6.5, STERILE POWDER-FREE SURGICAL GLOVE, POLYISOPRENE AND NEOPRENE BLEND: Brand: GAMMEX

## (undated) DEVICE — ANTIBACTERIAL UNDYED BRAIDED (POLYGLACTIN 910), SYNTHETIC ABSORBABLE SUTURE: Brand: COATED VICRYL

## (undated) NOTE — LETTER
[address], [place], [place]     AUTHORIZATION FOR SURGICAL OPERATION OR PROCEDURE    I hereby authorize Dr. Mihai Celaya my Physician(s) and whomever may be designated as the doctor's Assistant, to perform the following operation 4. I consent to the photographing of procedure(s) to be performed for the purposes of advancing medicine, science and/or education, provided my identity is not revealed.  If the procedure has been videotaped, the physician/surgeon will obtain the original v (Witness signature)                                                                                                  (Date)                                (Time)  STATEMENT OF PHYSICIAN My signature below affirms that prior to the time of the procedure;  I
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
## (undated) NOTE — LETTER
April 6, 2021    [employer], [address] [facility]     Patient: Opal Tan   YOB: 2000   Date of Visit: 4/6/2021       Dear Dr. Marcelo Neri MD:    Thank you for referring Opal Tan to me for evaluat use: Not Currently    Drug use: Never      Medications:  Current Outpatient Medications   Medication Sig Dispense Refill   • drospirenone-ethinyl estradiol 3-0.03 MG Oral Tab Take 1 tablet by mouth daily.  3 Package 3       Allergies:  No Known Allergies Cylinder Axis    Right -8.00 +1.25 070    Left -9.75 +2.00 110                 ASSESSMENT/PLAN:     Diagnoses and Plan:     Refractive amblyopia, left  Longstanding; per patient her left eye has always been weaker.       High myopia, bilateral  Continue wit

## (undated) NOTE — Clinical Note
Thank you for the consult. I saw Ms. Caban in  the endocrine/diabetes clinic today. Please see attached my note. Please feel free to contact me with any questions. Thanks!

## (undated) NOTE — LETTER
October 24, 2019    CITLALI Devine  [address]     Patient: Evan Hill   YOB: 2000   Date of Visit: 10/24/2019       Dear CITLALI Freitas:    Thank you for referring Evan Hill to me Smoking status: Never Smoker      Smokeless tobacco: Never Used    Alcohol use: Not Currently    Drug use: Never      Medications:  drospirenone-ethinyl estradiol 3-0.03 MG Oral Tab, Take 1 tablet by mouth daily. , Disp: 3 Package, Rfl: 3        Allergi Diagnoses and Plan:     High myopia, bilateral  Patient goes elsewhere for glasses and contacts. There is no evidence of retinal pathology. Information on Dr. Nikia Palomino given if patient is interested in consult for refractive surgery.      Floater,

## (undated) NOTE — LETTER
Patient Name: Chula Levine  : 2000  MRN: [id number]  Patient Address: [address] [place] [address]      Coronavirus Disease 2019 (COVID-19)     [facility] is committed to the safety and well-being of our patients, member carefully. If your symptoms get worse, call your healthcare provider immediately. 3. Get rest and stay hydrated.    4. If you have a medical appointment, call the healthcare provider ahead of time and tell them that you have or may have COVID-19.  5. For m of fever-reducing medications; and  · Improvement in respiratory symptoms (e.g., cough, shortness of breath); and  · At least 10 days have passed since symptoms first appeared OR if asymptomatic patient or date of symptom onset is unclear then use 10 days donors must:    · Have had a confirmed diagnosis of COVID-19  · Be symptom-free for at least 14 days*    *Some people will be required to have a repeat COVID-19 test in order to be eligible to donate.  If you’re instructed by Eve that a repeat test is r random. Researchers are trying to identify similarities between people with a Post-COVID condition to better understand if there are risk factors. How do I prevent a Post-COVID condition?   The best way to prevent the long-term symptoms of COVID-19 is

## (undated) NOTE — ED AVS SNAPSHOT
Constance Steviepratibha   MRN: [id number]    Department:  [facility] Emergency Department   Date of Visit:  11/13/2018           Disclosure     Insurance plans vary and the physician(s) referred by the ER may not be covered by your plan.  Please co CARE PHYSICIAN AT ONCE OR RETURN IMMEDIATELY TO THE EMERGENCY DEPARTMENT. If you have been prescribed any medication(s), please fill your prescription right away and begin taking the medication(s) as directed.   If you believe that any of the medications

## (undated) NOTE — MR AVS SNAPSHOT
After Visit Summary   7/10/2020    Deng Reyes    MRN: [id number]           Visit Information     Date & Time  7/10/2020  9:20 AM Provider  Arianna Jimenez MD [address] [place] [address], [address] [place] [address],[address], [facility].  Phone patients. Video Visits are available Monday - Friday for many common conditions such as allergies, colds, cough, fever, rash, sore throat, headache and pink eye.   The cost for a Video Visit is currently $35.         If you receive a survey from [employer] Saturday – Sunday  10:00 am – 4:00 pm  [facility]  Emergency Medicine Providers  Conditions needing urgent attention, but are   non-life-threatening.     Also available by appointment Average cost  $120*     EMERGENCY ROOM Life-threatening emergencies nee

## (undated) NOTE — MR AVS SNAPSHOT
[place]  [address]  [phone]               Thank you for choosing us for your health care visit with Aung Smith MD.  We are glad to serve you and happy to provide you with this summar BMI    30.48 kg/m2 (95 %*, Z = 1.66)    *Growth percentiles are based on CDC 2-20 Years data     BP percentiles are based on 2000 NHANES data         Current Medications          This list is accurate as of: 5/8/17  4:54 PM.  Always use your most recent

## (undated) NOTE — LETTER
Fred Bolivar  [address] [facility]  [place]       02/09/23        Patient: Roxie Bell   YOB: 2000   Date of Visit: 2/9/2023       Dear  Dr. Bartolo Bermudez, [facility],      Thank you for referring Roxie Bell to my practice. Please find my assessment and plan below. ASSESSMENT AND PLAN    1. Hearing loss, unspecified hearing loss type, unspecified laterality  Appears to be middle ear fluid. I did asked her to start methylprednisolone Sudafed and to continue fluticasone return to see me in 1 month with repeat audiogram if no better we will consider myringotomy. Sincerely,   Crystal Teran. Buster Foster MD   [address], [place]  [address] [facility] [id number]    Document electronically generated by:  Crystal Teran.  Buster Foster MD